# Patient Record
Sex: FEMALE | Race: WHITE | NOT HISPANIC OR LATINO | Employment: UNEMPLOYED | URBAN - METROPOLITAN AREA
[De-identification: names, ages, dates, MRNs, and addresses within clinical notes are randomized per-mention and may not be internally consistent; named-entity substitution may affect disease eponyms.]

---

## 2022-12-30 ENCOUNTER — LAB REQUISITION (OUTPATIENT)
Dept: LAB | Facility: CLINIC | Age: 62
End: 2022-12-30

## 2022-12-30 DIAGNOSIS — Z11.1 ENCOUNTER FOR SCREENING FOR RESPIRATORY TUBERCULOSIS: ICD-10-CM

## 2022-12-31 PROCEDURE — P9603 ONE-WAY ALLOW PRORATED MILES: HCPCS | Performed by: FAMILY MEDICINE

## 2022-12-31 PROCEDURE — 36415 COLL VENOUS BLD VENIPUNCTURE: CPT | Performed by: FAMILY MEDICINE

## 2022-12-31 PROCEDURE — 86481 TB AG RESPONSE T-CELL SUSP: CPT | Performed by: FAMILY MEDICINE

## 2023-01-01 LAB
GAMMA INTERFERON BACKGROUND BLD IA-ACNC: 0.05 IU/ML
M TB IFN-G BLD-IMP: NEGATIVE
M TB IFN-G CD4+ BCKGRND COR BLD-ACNC: 9.95 IU/ML
MITOGEN IGNF BCKGRD COR BLD-ACNC: 0 IU/ML
MITOGEN IGNF BCKGRD COR BLD-ACNC: 0.01 IU/ML
QUANTIFERON MITOGEN: 10 IU/ML
QUANTIFERON NIL TUBE: 0.05 IU/ML
QUANTIFERON TB1 TUBE: 0.05 IU/ML
QUANTIFERON TB2 TUBE: 0.06

## 2024-01-01 ENCOUNTER — LAB REQUISITION (OUTPATIENT)
Dept: LAB | Facility: CLINIC | Age: 64
End: 2024-01-01
Payer: COMMERCIAL

## 2024-01-01 ENCOUNTER — ANTICOAGULATION THERAPY VISIT (OUTPATIENT)
Dept: ANTICOAGULATION | Facility: CLINIC | Age: 64
End: 2024-01-01

## 2024-01-01 ENCOUNTER — TRANSITIONAL CARE UNIT VISIT (OUTPATIENT)
Dept: GERIATRICS | Facility: CLINIC | Age: 64
End: 2024-01-01
Payer: COMMERCIAL

## 2024-01-01 ENCOUNTER — TELEPHONE (OUTPATIENT)
Dept: ANTICOAGULATION | Facility: CLINIC | Age: 64
End: 2024-01-01

## 2024-01-01 ENCOUNTER — ANTICOAGULATION THERAPY VISIT (OUTPATIENT)
Dept: ANTICOAGULATION | Facility: CLINIC | Age: 64
End: 2024-01-01
Payer: COMMERCIAL

## 2024-01-01 ENCOUNTER — DOCUMENTATION ONLY (OUTPATIENT)
Dept: GERIATRICS | Facility: CLINIC | Age: 64
End: 2024-01-01
Payer: COMMERCIAL

## 2024-01-01 ENCOUNTER — TELEPHONE (OUTPATIENT)
Dept: GERIATRICS | Facility: CLINIC | Age: 64
End: 2024-01-01

## 2024-01-01 ENCOUNTER — LAB REQUISITION (OUTPATIENT)
Dept: LAB | Facility: CLINIC | Age: 64
End: 2024-01-01

## 2024-01-01 ENCOUNTER — OFFICE VISIT (OUTPATIENT)
Dept: GERIATRICS | Facility: CLINIC | Age: 64
End: 2024-01-01
Payer: COMMERCIAL

## 2024-01-01 VITALS
BODY MASS INDEX: 37.16 KG/M2 | OXYGEN SATURATION: 99 % | RESPIRATION RATE: 16 BRPM | SYSTOLIC BLOOD PRESSURE: 88 MMHG | WEIGHT: 245.2 LBS | HEIGHT: 68 IN | TEMPERATURE: 97.1 F | DIASTOLIC BLOOD PRESSURE: 58 MMHG | HEART RATE: 60 BPM

## 2024-01-01 VITALS
TEMPERATURE: 97.4 F | HEIGHT: 68 IN | SYSTOLIC BLOOD PRESSURE: 102 MMHG | RESPIRATION RATE: 18 BRPM | DIASTOLIC BLOOD PRESSURE: 59 MMHG | BODY MASS INDEX: 38.1 KG/M2 | OXYGEN SATURATION: 96 % | WEIGHT: 251.4 LBS | HEART RATE: 60 BPM

## 2024-01-01 VITALS
HEIGHT: 68 IN | BODY MASS INDEX: 36.98 KG/M2 | TEMPERATURE: 97.2 F | SYSTOLIC BLOOD PRESSURE: 104 MMHG | RESPIRATION RATE: 19 BRPM | HEART RATE: 80 BPM | DIASTOLIC BLOOD PRESSURE: 68 MMHG | OXYGEN SATURATION: 100 % | WEIGHT: 244 LBS

## 2024-01-01 VITALS
HEIGHT: 68 IN | RESPIRATION RATE: 16 BRPM | OXYGEN SATURATION: 93 % | SYSTOLIC BLOOD PRESSURE: 99 MMHG | BODY MASS INDEX: 37.71 KG/M2 | TEMPERATURE: 98.3 F | HEART RATE: 60 BPM | WEIGHT: 248.8 LBS | DIASTOLIC BLOOD PRESSURE: 66 MMHG

## 2024-01-01 VITALS
RESPIRATION RATE: 18 BRPM | SYSTOLIC BLOOD PRESSURE: 111 MMHG | DIASTOLIC BLOOD PRESSURE: 65 MMHG | HEIGHT: 68 IN | OXYGEN SATURATION: 94 % | HEART RATE: 60 BPM | WEIGHT: 252.6 LBS | BODY MASS INDEX: 38.28 KG/M2 | TEMPERATURE: 97 F

## 2024-01-01 VITALS
OXYGEN SATURATION: 91 % | DIASTOLIC BLOOD PRESSURE: 75 MMHG | WEIGHT: 255 LBS | HEART RATE: 60 BPM | HEIGHT: 68 IN | BODY MASS INDEX: 38.65 KG/M2 | RESPIRATION RATE: 16 BRPM | SYSTOLIC BLOOD PRESSURE: 125 MMHG | TEMPERATURE: 97.5 F

## 2024-01-01 VITALS
WEIGHT: 239.8 LBS | SYSTOLIC BLOOD PRESSURE: 100 MMHG | BODY MASS INDEX: 37 KG/M2 | HEART RATE: 82 BPM | OXYGEN SATURATION: 97 % | DIASTOLIC BLOOD PRESSURE: 67 MMHG | RESPIRATION RATE: 16 BRPM | TEMPERATURE: 98.2 F

## 2024-01-01 VITALS
WEIGHT: 240.2 LBS | HEART RATE: 100 BPM | DIASTOLIC BLOOD PRESSURE: 68 MMHG | RESPIRATION RATE: 16 BRPM | BODY MASS INDEX: 37.07 KG/M2 | SYSTOLIC BLOOD PRESSURE: 102 MMHG | OXYGEN SATURATION: 90 % | TEMPERATURE: 97.6 F

## 2024-01-01 VITALS
RESPIRATION RATE: 16 BRPM | OXYGEN SATURATION: 96 % | WEIGHT: 248.8 LBS | SYSTOLIC BLOOD PRESSURE: 96 MMHG | HEART RATE: 68 BPM | BODY MASS INDEX: 38.39 KG/M2 | TEMPERATURE: 98.3 F | DIASTOLIC BLOOD PRESSURE: 68 MMHG

## 2024-01-01 DIAGNOSIS — R07.9 ACUTE CHEST PAIN: Primary | ICD-10-CM

## 2024-01-01 DIAGNOSIS — Z95.810 S/P ICD (INTERNAL CARDIAC DEFIBRILLATOR) PROCEDURE: ICD-10-CM

## 2024-01-01 DIAGNOSIS — M54.40 LOW BACK PAIN WITH SCIATICA, SCIATICA LATERALITY UNSPECIFIED, UNSPECIFIED BACK PAIN LATERALITY, UNSPECIFIED CHRONICITY: ICD-10-CM

## 2024-01-01 DIAGNOSIS — M54.31 SCIATICA, RIGHT SIDE: ICD-10-CM

## 2024-01-01 DIAGNOSIS — I48.91 ATRIAL FIBRILLATION WITH RVR (H): Primary | ICD-10-CM

## 2024-01-01 DIAGNOSIS — I50.33 ACUTE ON CHRONIC DIASTOLIC HEART FAILURE (H): ICD-10-CM

## 2024-01-01 DIAGNOSIS — I10 ESSENTIAL (PRIMARY) HYPERTENSION: ICD-10-CM

## 2024-01-01 DIAGNOSIS — I48.20 CHRONIC ATRIAL FIBRILLATION (H): Primary | ICD-10-CM

## 2024-01-01 DIAGNOSIS — I50.23 ACUTE ON CHRONIC SYSTOLIC (CONGESTIVE) HEART FAILURE (H): ICD-10-CM

## 2024-01-01 DIAGNOSIS — E86.1 HYPOTENSION DUE TO HYPOVOLEMIA: ICD-10-CM

## 2024-01-01 DIAGNOSIS — E08.00 DIABETES MELLITUS DUE TO UNDERLYING CONDITION WITH HYPEROSMOLARITY WITHOUT COMA, WITHOUT LONG-TERM CURRENT USE OF INSULIN (H): ICD-10-CM

## 2024-01-01 DIAGNOSIS — I48.91 UNSPECIFIED ATRIAL FIBRILLATION (H): ICD-10-CM

## 2024-01-01 DIAGNOSIS — Z95.810 PRESENCE OF AUTOMATIC (IMPLANTABLE) CARDIAC DEFIBRILLATOR: ICD-10-CM

## 2024-01-01 DIAGNOSIS — R52 PAIN: ICD-10-CM

## 2024-01-01 DIAGNOSIS — I10 HYPERTENSION, UNSPECIFIED TYPE: ICD-10-CM

## 2024-01-01 DIAGNOSIS — E86.0 DEHYDRATION: ICD-10-CM

## 2024-01-01 DIAGNOSIS — R44.3 HALLUCINATIONS: ICD-10-CM

## 2024-01-01 DIAGNOSIS — G89.4 CHRONIC PAIN DISORDER: ICD-10-CM

## 2024-01-01 DIAGNOSIS — R07.9 ACUTE CHEST PAIN: ICD-10-CM

## 2024-01-01 DIAGNOSIS — I10 HYPERTENSION, UNSPECIFIED TYPE: Primary | ICD-10-CM

## 2024-01-01 DIAGNOSIS — I25.10 CORONARY ARTERY DISEASE INVOLVING NATIVE CORONARY ARTERY OF NATIVE HEART WITHOUT ANGINA PECTORIS: ICD-10-CM

## 2024-01-01 DIAGNOSIS — E03.9 HYPOTHYROIDISM, UNSPECIFIED: ICD-10-CM

## 2024-01-01 DIAGNOSIS — R52 PAIN: Primary | ICD-10-CM

## 2024-01-01 DIAGNOSIS — E03.2 HYPOTHYROIDISM DUE TO MEDICATION: ICD-10-CM

## 2024-01-01 DIAGNOSIS — R41.0 DISORIENTATION, UNSPECIFIED: ICD-10-CM

## 2024-01-01 DIAGNOSIS — G93.40 ACUTE ENCEPHALOPATHY: ICD-10-CM

## 2024-01-01 DIAGNOSIS — I48.91 ATRIAL FIBRILLATION WITH RVR (H): ICD-10-CM

## 2024-01-01 DIAGNOSIS — I87.2 VENOUS INSUFFICIENCY (CHRONIC) (PERIPHERAL): ICD-10-CM

## 2024-01-01 DIAGNOSIS — G89.4 CHRONIC PAIN DISORDER: Primary | ICD-10-CM

## 2024-01-01 DIAGNOSIS — I48.20 CHRONIC ATRIAL FIBRILLATION (H): ICD-10-CM

## 2024-01-01 DIAGNOSIS — R44.3 HALLUCINATIONS, UNSPECIFIED: ICD-10-CM

## 2024-01-01 DIAGNOSIS — M25.551 HIP PAIN, RIGHT: ICD-10-CM

## 2024-01-01 DIAGNOSIS — N30.00 ACUTE CYSTITIS WITHOUT HEMATURIA: ICD-10-CM
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
## 2024-01-01 DIAGNOSIS — Z98.890 HX OF ATRIOVENTRICULAR NODE ABLATION: ICD-10-CM

## 2024-01-01 DIAGNOSIS — Z98.890 HX OF ATRIOVENTRICULAR NODE ABLATION: Primary | ICD-10-CM

## 2024-01-01 LAB
ALBUMIN UR-MCNC: 10 MG/DL
ALBUMIN UR-MCNC: NEGATIVE MG/DL
ANION GAP SERPL CALCULATED.3IONS-SCNC: 10 MMOL/L (ref 7–15)
ANION GAP SERPL CALCULATED.3IONS-SCNC: 10 MMOL/L (ref 7–15)
ANION GAP SERPL CALCULATED.3IONS-SCNC: 11 MMOL/L (ref 7–15)
ANION GAP SERPL CALCULATED.3IONS-SCNC: 12 MMOL/L (ref 7–15)
ANION GAP SERPL CALCULATED.3IONS-SCNC: 13 MMOL/L (ref 7–15)
ANION GAP SERPL CALCULATED.3IONS-SCNC: 14 MMOL/L (ref 7–15)
APPEARANCE UR: ABNORMAL
APPEARANCE UR: ABNORMAL
BACTERIA UR CULT: ABNORMAL
BACTERIA UR CULT: NO GROWTH
BILIRUB UR QL STRIP: NEGATIVE
BILIRUB UR QL STRIP: NEGATIVE
BUN SERPL-MCNC: 15.7 MG/DL (ref 8–23)
BUN SERPL-MCNC: 24 MG/DL (ref 8–23)
BUN SERPL-MCNC: 25.8 MG/DL (ref 8–23)
BUN SERPL-MCNC: 26.2 MG/DL (ref 8–23)
BUN SERPL-MCNC: 27 MG/DL (ref 8–23)
BUN SERPL-MCNC: 28.3 MG/DL (ref 8–23)
BUN SERPL-MCNC: 30.8 MG/DL (ref 8–23)
BUN SERPL-MCNC: 31 MG/DL (ref 8–23)
CALCIUM SERPL-MCNC: 9 MG/DL (ref 8.8–10.2)
CALCIUM SERPL-MCNC: 9.2 MG/DL (ref 8.8–10.2)
CALCIUM SERPL-MCNC: 9.2 MG/DL (ref 8.8–10.2)
CALCIUM SERPL-MCNC: 9.3 MG/DL (ref 8.8–10.2)
CALCIUM SERPL-MCNC: 9.5 MG/DL (ref 8.8–10.2)
CALCIUM SERPL-MCNC: 9.6 MG/DL (ref 8.8–10.2)
CHLORIDE SERPL-SCNC: 100 MMOL/L (ref 98–107)
CHLORIDE SERPL-SCNC: 97 MMOL/L (ref 98–107)
CHLORIDE SERPL-SCNC: 97 MMOL/L (ref 98–107)
CHLORIDE SERPL-SCNC: 98 MMOL/L (ref 98–107)
CHLORIDE SERPL-SCNC: 99 MMOL/L (ref 98–107)
CHLORIDE SERPL-SCNC: 99 MMOL/L (ref 98–107)
COLOR UR AUTO: YELLOW
COLOR UR AUTO: YELLOW
CREAT SERPL-MCNC: 1.14 MG/DL (ref 0.51–0.95)
CREAT SERPL-MCNC: 1.23 MG/DL (ref 0.51–0.95)
CREAT SERPL-MCNC: 1.33 MG/DL (ref 0.51–0.95)
CREAT SERPL-MCNC: 1.37 MG/DL (ref 0.51–0.95)
CREAT SERPL-MCNC: 1.44 MG/DL (ref 0.51–0.95)
CREAT SERPL-MCNC: 1.47 MG/DL (ref 0.51–0.95)
CREAT SERPL-MCNC: 1.53 MG/DL (ref 0.51–0.95)
CREAT SERPL-MCNC: 1.68 MG/DL (ref 0.51–0.95)
DEPRECATED HCO3 PLAS-SCNC: 22 MMOL/L (ref 22–29)
DEPRECATED HCO3 PLAS-SCNC: 22 MMOL/L (ref 22–29)
DEPRECATED HCO3 PLAS-SCNC: 24 MMOL/L (ref 22–29)
DEPRECATED HCO3 PLAS-SCNC: 25 MMOL/L (ref 22–29)
DEPRECATED HCO3 PLAS-SCNC: 26 MMOL/L (ref 22–29)
DEPRECATED HCO3 PLAS-SCNC: 28 MMOL/L (ref 22–29)
EGFRCR SERPLBLD CKD-EPI 2021: 34 ML/MIN/1.73M2
EGFRCR SERPLBLD CKD-EPI 2021: 38 ML/MIN/1.73M2
EGFRCR SERPLBLD CKD-EPI 2021: 40 ML/MIN/1.73M2
EGFRCR SERPLBLD CKD-EPI 2021: 41 ML/MIN/1.73M2
EGFRCR SERPLBLD CKD-EPI 2021: 43 ML/MIN/1.73M2
EGFRCR SERPLBLD CKD-EPI 2021: 45 ML/MIN/1.73M2
EGFRCR SERPLBLD CKD-EPI 2021: 49 ML/MIN/1.73M2
EGFRCR SERPLBLD CKD-EPI 2021: 54 ML/MIN/1.73M2
ERYTHROCYTE [DISTWIDTH] IN BLOOD BY AUTOMATED COUNT: 12.5 % (ref 10–15)
ERYTHROCYTE [DISTWIDTH] IN BLOOD BY AUTOMATED COUNT: 12.6 % (ref 10–15)
ERYTHROCYTE [DISTWIDTH] IN BLOOD BY AUTOMATED COUNT: 12.7 % (ref 10–15)
ERYTHROCYTE [DISTWIDTH] IN BLOOD BY AUTOMATED COUNT: 12.9 % (ref 10–15)
GLUCOSE SERPL-MCNC: 109 MG/DL (ref 70–99)
GLUCOSE SERPL-MCNC: 165 MG/DL (ref 70–99)
GLUCOSE SERPL-MCNC: 79 MG/DL (ref 70–99)
GLUCOSE SERPL-MCNC: 83 MG/DL (ref 70–99)
GLUCOSE SERPL-MCNC: 84 MG/DL (ref 70–99)
GLUCOSE SERPL-MCNC: 86 MG/DL (ref 70–99)
GLUCOSE SERPL-MCNC: 92 MG/DL (ref 70–99)
GLUCOSE SERPL-MCNC: 93 MG/DL (ref 70–99)
GLUCOSE UR STRIP-MCNC: NEGATIVE MG/DL
GLUCOSE UR STRIP-MCNC: NEGATIVE MG/DL
HCT VFR BLD AUTO: 31.6 % (ref 35–47)
HCT VFR BLD AUTO: 35.9 % (ref 35–47)
HCT VFR BLD AUTO: 36.1 % (ref 35–47)
HCT VFR BLD AUTO: 39.4 % (ref 35–47)
HGB BLD-MCNC: 11.2 G/DL (ref 11.7–15.7)
HGB BLD-MCNC: 11.2 G/DL (ref 11.7–15.7)
HGB BLD-MCNC: 12.2 G/DL (ref 11.7–15.7)
HGB BLD-MCNC: 9.7 G/DL (ref 11.7–15.7)
HGB UR QL STRIP: ABNORMAL
HGB UR QL STRIP: NEGATIVE
INR (EXTERNAL): 1.8 (ref 0.9–1.1)
INR (EXTERNAL): 2.1
INR (EXTERNAL): 2.3 (ref 0.9–1.1)
INR (EXTERNAL): 2.7
INR (EXTERNAL): 2.7 (ref 0.9–1.1)
INR (EXTERNAL): 2.7 (ref 0.9–1.1)
INR (EXTERNAL): 2.8
INR (EXTERNAL): 2.8 (ref 0.9–1.1)
INR (EXTERNAL): 2.9 (ref 0.9–1.1)
INR (EXTERNAL): 3.1 (ref 0.9–1.1)
INR (EXTERNAL): 3.3 (ref 0.9–1.1)
INR (EXTERNAL): 3.3 (ref 0.9–1.1)
INR (EXTERNAL): 3.6 (ref 0.9–1.1)
INR (EXTERNAL): 4 (ref 0.9–1.1)
KETONES UR STRIP-MCNC: NEGATIVE MG/DL
KETONES UR STRIP-MCNC: NEGATIVE MG/DL
LEUKOCYTE ESTERASE UR QL STRIP: ABNORMAL
LEUKOCYTE ESTERASE UR QL STRIP: NEGATIVE
MCH RBC QN AUTO: 28 PG (ref 26.5–33)
MCH RBC QN AUTO: 28.6 PG (ref 26.5–33)
MCH RBC QN AUTO: 28.6 PG (ref 26.5–33)
MCH RBC QN AUTO: 28.8 PG (ref 26.5–33)
MCHC RBC AUTO-ENTMCNC: 30.7 G/DL (ref 31.5–36.5)
MCHC RBC AUTO-ENTMCNC: 31 G/DL (ref 31.5–36.5)
MCHC RBC AUTO-ENTMCNC: 31 G/DL (ref 31.5–36.5)
MCHC RBC AUTO-ENTMCNC: 31.2 G/DL (ref 31.5–36.5)
MCV RBC AUTO: 91 FL (ref 78–100)
MCV RBC AUTO: 92 FL (ref 78–100)
MCV RBC AUTO: 92 FL (ref 78–100)
MCV RBC AUTO: 93 FL (ref 78–100)
MUCOUS THREADS #/AREA URNS LPF: PRESENT /LPF
MUCOUS THREADS #/AREA URNS LPF: PRESENT /LPF
NITRATE UR QL: NEGATIVE
NITRATE UR QL: NEGATIVE
PH UR STRIP: 5.5 [PH] (ref 5–7)
PH UR STRIP: 5.5 [PH] (ref 5–7)
PLATELET # BLD AUTO: 225 10E3/UL (ref 150–450)
PLATELET # BLD AUTO: 227 10E3/UL (ref 150–450)
PLATELET # BLD AUTO: 279 10E3/UL (ref 150–450)
PLATELET # BLD AUTO: 315 10E3/UL (ref 150–450)
POTASSIUM SERPL-SCNC: 4.4 MMOL/L (ref 3.4–5.3)
POTASSIUM SERPL-SCNC: 4.7 MMOL/L (ref 3.4–5.3)
POTASSIUM SERPL-SCNC: 4.8 MMOL/L (ref 3.4–5.3)
POTASSIUM SERPL-SCNC: 5 MMOL/L (ref 3.4–5.3)
POTASSIUM SERPL-SCNC: 5.2 MMOL/L (ref 3.4–5.3)
POTASSIUM SERPL-SCNC: 5.4 MMOL/L (ref 3.4–5.3)
RBC # BLD AUTO: 3.47 10E6/UL (ref 3.8–5.2)
RBC # BLD AUTO: 3.89 10E6/UL (ref 3.8–5.2)
RBC # BLD AUTO: 3.92 10E6/UL (ref 3.8–5.2)
RBC # BLD AUTO: 4.27 10E6/UL (ref 3.8–5.2)
RBC URINE: 3 /HPF
RBC URINE: 3 /HPF
SODIUM SERPL-SCNC: 133 MMOL/L (ref 135–145)
SODIUM SERPL-SCNC: 134 MMOL/L (ref 135–145)
SODIUM SERPL-SCNC: 134 MMOL/L (ref 135–145)
SODIUM SERPL-SCNC: 135 MMOL/L (ref 135–145)
SODIUM SERPL-SCNC: 135 MMOL/L (ref 135–145)
SODIUM SERPL-SCNC: 136 MMOL/L (ref 135–145)
SODIUM SERPL-SCNC: 136 MMOL/L (ref 135–145)
SODIUM SERPL-SCNC: 138 MMOL/L (ref 135–145)
SP GR UR STRIP: 1.01 (ref 1–1.03)
SP GR UR STRIP: 1.02 (ref 1–1.03)
SQUAMOUS EPITHELIAL: 1 /HPF
SQUAMOUS EPITHELIAL: 3 /HPF
T3 SERPL-MCNC: 61 NG/DL (ref 85–202)
T4 FREE SERPL-MCNC: 0.81 NG/DL (ref 0.9–1.7)
TRANSITIONAL EPI: <1 /HPF
TSH SERPL DL<=0.005 MIU/L-ACNC: 11.6 UIU/ML (ref 0.3–4.2)
TSH SERPL DL<=0.005 MIU/L-ACNC: 15.6 UIU/ML (ref 0.3–4.2)
URATE CRY #/AREA URNS HPF: ABNORMAL /HPF
UROBILINOGEN UR STRIP-MCNC: NORMAL MG/DL
UROBILINOGEN UR STRIP-MCNC: NORMAL MG/DL
WBC # BLD AUTO: 10 10E3/UL (ref 4–11)
WBC # BLD AUTO: 10.9 10E3/UL (ref 4–11)
WBC # BLD AUTO: 8.5 10E3/UL (ref 4–11)
WBC # BLD AUTO: 9 10E3/UL (ref 4–11)
WBC CLUMPS #/AREA URNS HPF: PRESENT /HPF
WBC URINE: 1 /HPF
WBC URINE: >182 /HPF

## 2024-01-01 PROCEDURE — 80048 BASIC METABOLIC PNL TOTAL CA: CPT | Mod: ORL | Performed by: FAMILY MEDICINE

## 2024-01-01 PROCEDURE — 85027 COMPLETE CBC AUTOMATED: CPT | Mod: ORL | Performed by: NURSE PRACTITIONER

## 2024-01-01 PROCEDURE — 84443 ASSAY THYROID STIM HORMONE: CPT | Mod: ORL | Performed by: NURSE PRACTITIONER

## 2024-01-01 PROCEDURE — 84439 ASSAY OF FREE THYROXINE: CPT | Mod: ORL | Performed by: NURSE PRACTITIONER

## 2024-01-01 PROCEDURE — 80048 BASIC METABOLIC PNL TOTAL CA: CPT | Mod: ORL | Performed by: NURSE PRACTITIONER

## 2024-01-01 PROCEDURE — 81001 URINALYSIS AUTO W/SCOPE: CPT | Mod: ORL | Performed by: NURSE PRACTITIONER

## 2024-01-01 PROCEDURE — 87086 URINE CULTURE/COLONY COUNT: CPT | Mod: ORL | Performed by: NURSE PRACTITIONER

## 2024-01-01 PROCEDURE — 36415 COLL VENOUS BLD VENIPUNCTURE: CPT | Mod: ORL | Performed by: FAMILY MEDICINE

## 2024-01-01 PROCEDURE — 36415 COLL VENOUS BLD VENIPUNCTURE: CPT | Mod: ORL | Performed by: NURSE PRACTITIONER

## 2024-01-01 PROCEDURE — 99309 SBSQ NF CARE MODERATE MDM 30: CPT | Performed by: NURSE PRACTITIONER

## 2024-01-01 PROCEDURE — P9604 ONE-WAY ALLOW PRORATED TRIP: HCPCS | Mod: ORL | Performed by: NURSE PRACTITIONER

## 2024-01-01 PROCEDURE — P9604 ONE-WAY ALLOW PRORATED TRIP: HCPCS | Mod: ORL | Performed by: FAMILY MEDICINE

## 2024-01-01 PROCEDURE — 87186 SC STD MICRODIL/AGAR DIL: CPT | Mod: ORL | Performed by: NURSE PRACTITIONER

## 2024-01-01 PROCEDURE — 84480 ASSAY TRIIODOTHYRONINE (T3): CPT | Mod: ORL | Performed by: NURSE PRACTITIONER

## 2024-01-01 PROCEDURE — 99310 SBSQ NF CARE HIGH MDM 45: CPT | Performed by: NURSE PRACTITIONER

## 2024-01-01 RX ORDER — TRAMADOL HYDROCHLORIDE 25 MG/1
25 TABLET, COATED ORAL EVERY 6 HOURS PRN
Qty: 30 TABLET | Refills: 0 | Status: SHIPPED | OUTPATIENT
Start: 2024-01-01 | End: 2024-01-01

## 2024-01-01 RX ORDER — SULFAMETHOXAZOLE/TRIMETHOPRIM 800-160 MG
1 TABLET ORAL 2 TIMES DAILY
COMMUNITY
Start: 2024-01-01 | End: 2024-01-01

## 2024-01-01 RX ORDER — AMIODARONE HYDROCHLORIDE 200 MG/1
200 TABLET ORAL DAILY
COMMUNITY
Start: 2024-01-01 | End: 2024-01-01

## 2024-01-01 RX ORDER — TRAMADOL HYDROCHLORIDE 50 MG/1
TABLET ORAL
Qty: 16 TABLET | Refills: 1 | Status: SHIPPED | OUTPATIENT
Start: 2024-01-01 | End: 2024-01-01

## 2024-01-01 RX ORDER — TRAMADOL HYDROCHLORIDE 25 MG/1
25 TABLET, COATED ORAL EVERY 6 HOURS PRN
COMMUNITY
Start: 2024-01-01 | End: 2024-01-01

## 2024-01-01 RX ORDER — ALBUTEROL SULFATE 90 UG/1
2 AEROSOL, METERED RESPIRATORY (INHALATION) 4 TIMES DAILY PRN
COMMUNITY
Start: 2024-01-01

## 2024-01-01 RX ORDER — LEVOTHYROXINE SODIUM 25 UG/1
25 TABLET ORAL
COMMUNITY

## 2024-01-01 RX ORDER — CLONAZEPAM 0.5 MG/1
0.5 TABLET ORAL DAILY PRN
COMMUNITY
Start: 2024-01-01

## 2024-01-01 RX ORDER — AMIODARONE HYDROCHLORIDE 200 MG/1
400 TABLET ORAL 2 TIMES DAILY
COMMUNITY
Start: 2024-01-01 | End: 2024-01-01

## 2024-01-01 RX ORDER — FUROSEMIDE 40 MG
20 TABLET ORAL DAILY PRN
COMMUNITY
Start: 2024-01-01

## 2024-01-01 RX ORDER — GABAPENTIN 600 MG/1
300-900 TABLET ORAL 3 TIMES DAILY
COMMUNITY
Start: 2024-01-01

## 2024-01-01 RX ORDER — TRAMADOL HYDROCHLORIDE 25 MG/1
25 TABLET, COATED ORAL EVERY 8 HOURS PRN
Qty: 10 TABLET | Refills: 0 | Status: SHIPPED | OUTPATIENT
Start: 2024-01-01 | End: 2024-01-01

## 2024-01-01 RX ORDER — FERROUS SULFATE 325(65) MG
325 TABLET ORAL
COMMUNITY
Start: 2024-01-01

## 2024-01-01 RX ORDER — FEXOFENADINE HCL 60 MG/1
60 TABLET, FILM COATED ORAL 2 TIMES DAILY PRN
COMMUNITY
Start: 2024-01-01

## 2024-01-01 RX ORDER — TRAMADOL HYDROCHLORIDE 50 MG/1
TABLET ORAL
Qty: 30 TABLET | Refills: 0 | Status: SHIPPED | OUTPATIENT
Start: 2024-01-01

## 2024-01-01 RX ORDER — DIPHENHYDRAMINE HCL 25 MG
25 CAPSULE ORAL
COMMUNITY
Start: 2024-01-01 | End: 2024-01-01

## 2024-01-01 RX ORDER — LANOLIN ALCOHOL/MO/W.PET/CERES
3 CREAM (GRAM) TOPICAL
COMMUNITY
Start: 2024-01-01

## 2024-01-01 RX ORDER — SENNOSIDES 8.6 MG
650 CAPSULE ORAL EVERY 8 HOURS PRN
COMMUNITY
Start: 2024-01-01

## 2024-01-01 RX ORDER — CYCLOBENZAPRINE HCL 5 MG
5 TABLET ORAL 3 TIMES DAILY
COMMUNITY
Start: 2024-01-01 | End: 2024-01-01

## 2024-01-01 RX ORDER — MAGNESIUM OXIDE 400 MG/1
400 TABLET ORAL DAILY
COMMUNITY
Start: 2024-01-01

## 2024-01-01 RX ORDER — LISINOPRIL 5 MG/1
5 TABLET ORAL DAILY
COMMUNITY
Start: 2024-01-01 | End: 2024-01-01

## 2024-01-01 RX ORDER — ATORVASTATIN CALCIUM 80 MG/1
80 TABLET, FILM COATED ORAL DAILY
COMMUNITY
Start: 2024-01-01

## 2024-01-01 RX ORDER — DULOXETIN HYDROCHLORIDE 30 MG/1
30 CAPSULE, DELAYED RELEASE ORAL DAILY
COMMUNITY

## 2024-01-01 RX ORDER — SPIRONOLACTONE 25 MG/1
25 TABLET ORAL DAILY
COMMUNITY
Start: 2024-01-01 | End: 2024-01-01

## 2024-01-01 RX ORDER — BUPROPION HYDROCHLORIDE 75 MG/1
120 TABLET ORAL 2 TIMES DAILY
COMMUNITY
Start: 2024-01-01

## 2024-01-01 RX ORDER — TRAZODONE HYDROCHLORIDE 50 MG/1
50 TABLET, FILM COATED ORAL
COMMUNITY
Start: 2024-01-01

## 2024-01-01 RX ORDER — AMIODARONE HYDROCHLORIDE 200 MG/1
400 TABLET ORAL DAILY
COMMUNITY
Start: 2024-01-01 | End: 2024-01-01

## 2024-01-01 RX ORDER — NITROGLYCERIN 0.4 MG/1
0.4 TABLET SUBLINGUAL EVERY 5 MIN PRN
COMMUNITY
Start: 2024-01-01

## 2024-01-01 RX ORDER — BISOPROLOL FUMARATE 5 MG/1
5 TABLET, FILM COATED ORAL DAILY
COMMUNITY
Start: 2024-01-01

## 2024-01-01 RX ORDER — ASPIRIN 81 MG/1
162 TABLET ORAL DAILY
COMMUNITY
Start: 2024-01-01

## 2024-05-07 PROBLEM — G47.33 OBSTRUCTIVE SLEEP APNEA: Status: ACTIVE | Noted: 2024-01-01

## 2024-05-07 PROBLEM — M17.0 PRIMARY OSTEOARTHRITIS OF BOTH KNEES: Status: ACTIVE | Noted: 2018-06-18

## 2024-05-07 PROBLEM — M16.0 OSTEOARTHRITIS OF BOTH HIPS: Status: ACTIVE | Noted: 2023-01-01

## 2024-05-07 PROBLEM — Z95.810 ICD (IMPLANTABLE CARDIOVERTER-DEFIBRILLATOR) IN PLACE: Status: ACTIVE | Noted: 2022-12-03

## 2024-05-07 PROBLEM — Z74.09 DECREASED FUNCTIONAL MOBILITY AND ENDURANCE: Status: ACTIVE | Noted: 2024-01-01

## 2024-05-07 PROBLEM — M54.31 SCIATICA, RIGHT SIDE: Status: ACTIVE | Noted: 2023-05-11

## 2024-05-07 PROBLEM — I87.2 VENOUS STASIS DERMATITIS OF BOTH LOWER EXTREMITIES: Status: ACTIVE | Noted: 2023-05-11

## 2024-05-07 PROBLEM — I10 HYPERTENSION: Status: ACTIVE | Noted: 2024-01-01

## 2024-05-07 PROBLEM — I48.91 ATRIAL FIBRILLATION WITH RVR (H): Status: ACTIVE | Noted: 2024-01-01

## 2024-05-07 PROBLEM — D72.829 LEUKOCYTOSIS: Status: ACTIVE | Noted: 2022-12-03

## 2024-05-07 PROBLEM — U07.1 COVID-19: Status: ACTIVE | Noted: 2021-12-01

## 2024-05-07 PROBLEM — R05.9 COUGH: Status: ACTIVE | Noted: 2018-05-17

## 2024-05-07 PROBLEM — I47.20 VENTRICULAR TACHYCARDIA (H): Status: ACTIVE | Noted: 2022-11-01

## 2024-05-07 PROBLEM — R60.0 BILATERAL LOWER EXTREMITY EDEMA: Status: ACTIVE | Noted: 2023-05-11

## 2024-05-07 PROBLEM — R55 PRE-SYNCOPE: Status: ACTIVE | Noted: 2022-12-03

## 2024-05-07 PROBLEM — M51.369 DEGENERATIVE DISC DISEASE, LUMBAR: Status: ACTIVE | Noted: 2024-01-01

## 2024-05-07 PROBLEM — G89.4 CHRONIC PAIN DISORDER: Status: ACTIVE | Noted: 2018-05-17

## 2024-05-07 PROBLEM — F33.2 MAJOR DEPRESSIVE DISORDER, RECURRENT SEVERE WITHOUT PSYCHOTIC FEATURES (H): Status: ACTIVE | Noted: 2023-01-10

## 2024-05-07 PROBLEM — J30.9 ALLERGIC RHINITIS, MILD: Status: ACTIVE | Noted: 2024-01-01

## 2024-05-07 PROBLEM — B02.23 SHINGLES (HERPES ZOSTER) POLYNEUROPATHY: Status: ACTIVE | Noted: 2022-12-04

## 2024-05-07 PROBLEM — I25.10 CORONARY ATHEROSCLEROSIS: Status: ACTIVE | Noted: 2024-01-01

## 2024-05-07 PROBLEM — I50.23 ACUTE ON CHRONIC HFREF (HEART FAILURE WITH REDUCED EJECTION FRACTION) (H): Status: ACTIVE | Noted: 2024-01-01

## 2024-05-07 PROBLEM — E87.20 LACTIC ACIDOSIS: Status: ACTIVE | Noted: 2024-01-01

## 2024-05-07 PROBLEM — M17.0 OSTEOARTHRITIS OF BOTH KNEES: Status: ACTIVE | Noted: 2018-06-18

## 2024-05-07 PROBLEM — G47.20 CIRCADIAN RHYTHM SLEEP DISORDER: Status: ACTIVE | Noted: 2017-07-06

## 2024-05-07 PROBLEM — I48.0 PAROXYSMAL ATRIAL FIBRILLATION (H): Status: ACTIVE | Noted: 2023-01-01

## 2024-05-07 PROBLEM — R87.810 CERVICAL HIGH RISK HPV (HUMAN PAPILLOMAVIRUS) TEST POSITIVE: Status: ACTIVE | Noted: 2023-05-11

## 2024-05-07 PROBLEM — R79.89 ELEVATED TROPONIN: Status: ACTIVE | Noted: 2022-12-03

## 2024-05-07 PROBLEM — Z86.19 HISTORY OF SEPSIS: Status: ACTIVE | Noted: 2024-01-01

## 2024-05-07 PROBLEM — T14.8XXA OPEN WOUND OF SKIN: Status: ACTIVE | Noted: 2023-04-09

## 2024-05-07 NOTE — PROGRESS NOTES
ANTICOAGULATION MANAGEMENT     Vikki Evans 63 year old female is on warfarin with therapeutic INR result. (Goal INR 2.0-3.0)    Recent labs: (last 7 days)     05/07/24  0000   INR 2.8*       ASSESSMENT     Source(s): Chart Review and Home Care/Facility Nurse     Warfarin doses taken: Reviewed in chart  Diet: No new diet changes identified  Medication/supplement changes: Pt on amiodarone 400 mg BID X 3 days then 400 mg every day X 14 days then 200 mg daily  New illness, injury, or hospitalization: Yes: hospitalized at [facility] from 4/28/24 to 5/6/24 then transferred to TCU for ICD inappropriate shock secondary to A-fib RVR with acute chronic heart failure and cardiogenic shock.  Signs or symptoms of bleeding or clotting: No  Previous result:  new on warfarin day # 8  Additional findings: Pt started on 2 mg daily on 5/6/24.       PLAN     Recommended plan for ongoing change(s) affecting INR     Dosing Instructions: Continue your current warfarin dose with next INR in 2 days       Summary  As of 5/7/2024      Full warfarin instructions:  2 mg every day   Next INR check:  5/9/2024               Telephone call with Carmela at [phone] facility nurse who agrees to plan and repeated back plan correctly    Orders given to  Homecare nurse/facility to recheck    Education provided:   Contact [phone]  with any changes, questions or concerns.     Plan made with [facility] Pharmacist Nieves Nava, RN  Anticoagulation Clinic  5/7/2024    _______________________________________________________________________     Anticoagulation Episode Summary       Current INR goal:  2.0-3.0   TTR:  --   Target end date:  Indefinite   Send INR reminders to:  TAMMI MARQUES    Indications    Atrial fibrillation with RVR (H) [I48.91]             Comments:  A-Fib             Anticoagulation Care Providers       Provider Role Specialty Phone number    Nina Birch NP Referring Nurse Practitioner [phone]

## 2024-05-07 NOTE — PROGRESS NOTES
[facility]    Code Status:  FULL CODE   Visit Type:   Chief Complaint   Patient presents with    TCU Admission     [facility] 4/28/2024 - 5/6/2024     Facility:  [facility] ([facility]) [[id number]]         HPI: Vikki Evans is a 63 year old female who I am seeing today for admit to the TCU. Past medical history includes   VT s/p ablation (2016), NSVT, paroxysmal atrial fibrillation/flutter, chronic HFrEF, CAD status post stents, MVR, TVR, hypertension, hyperlipidemia, NARESH, DM2, and depression. Pt presented with fatigue and nausea. She had recently been at the ER and had leukocytosis with questionable pulmonary infiltrate. She was sent home on Augmentin and Z pack for possible pneumonia. Progressive dyspnea with inappropriate ICD shock 2/t A fib with RVR. Pt found to have acute on chronic CHF with cardiogenic shock. Pt initially required O2 up to 10L. Device interrogated with mx inappropriate shocks of A fib. She was treated with IV amiodarone and Digoxin and transitioned to oral amiodarone.  DEANA done which did not demonstrate LV aneurysm. AC changed to Coumadin. She was treated with IV diuretics and transitioned to orals.     Cardiac CT showed:     No intracardiac mass or thrombus is detected.  No left atrial appendage thrombus.  Possible pseudoaneurysm versus contained rupture (1 x 0.8 cm) noted at the anterior/apical left ventricular wall.  No pericardial effusion.  Prominent apical calcification.  Biventricular ICD is present.  Left ventricular enlargement is present.     Transitional Care Course: Today pt sitting up in bedside chair. She denies any SOB or CP. She is off oxygen. She using CPAP at HS. She continues on Lasix. Trace BLE edema. She reports chronic pain in her back and legs. She continues on home gabapentin. She has prn tramadol she can also use. Pt reports some dizziness with changing positions. She is on mx meds that could be contributory. BP and pulse appears satisfactory. She  reports she is voiding adequately. No further shocks reported.       Assessment/Plan:     Atrial fibrillation with RVR (H)  S/P ICD (internal cardiac defibrillator) procedure  -Amiodarone tapering.   -Denies CP or palpitations.   -INR today 2.8.   -Coumadin Clinic to dose INR.     Acute on chronic diastolic heart failure (H)  -Lasix 40 mg every day.   -Lisinopril 5 mg every day.   -Spironolactone 12.5 mg every day.   -every day weights.   -Follow up BMP.     Coronary artery disease involving native coronary artery of native heart without angina pectoris  -Denies any SOB or CP.     Low back pain with sciatica, sciatica laterality unspecified, unspecified back pain laterality, unspecified chronicity  Sciatica, right side  -Continue PTA gabapentin.   -Tramadol prn.   -Flexeril 5 mg TID.     Hypertension, unspecified type  -Continue lisinopril.   -Monitor bp.     Diabetes mellitus due to underlying condition with hyperosmolarity without coma, without long-term current use of insulin (H)  -diet controlled.     Ok for PT/OT to eval and treat.     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    Cervical high risk HPV (human papillomavirus) test positive 05/11/2023    Bilateral lower extremity edema 05/11/2023    Anemia, unspecified 04/30/2013    Allergic rhinitis, mild 05/07/2024    Hyperparathyroidism (H24) 10/07/2012    Hyperlipidemia LDL goal <70 11/19/2011    History of sepsis 03/18/2024    History of nephrolithiasis 01/13/2013    Hand pain 10/25/2014    DEISI (generalized anxiety disorder) 02/23/2016    Elevated troponin 12/03/2022    Dysthymia 09/23/2015    Degenerative disc disease, lumbar 01/05/2024     Decreased functional mobility and endurance 01/05/2024    Cough 05/17/2018    Hypertension 05/07/2024    Osteoarthritis of both hips 10/31/2023    Open wound of skin 04/09/2023    Obstructive sleep apnea 05/07/2024    Nevus of left foot 12/31/2015    Neck pain, chronic 04/18/2012    Chronic pain disorder 05/17/2018    Major depressive disorder, recurrent severe without psychotic features (H) 01/10/2023    Leukocytosis 12/03/2022    Lactic acidosis 04/29/2024    ICD (implantable cardioverter-defibrillator) in place 12/03/2022    Pre-syncope 12/03/2022    Paroxysmal atrial fibrillation (H) 10/31/2023    Atrial fibrillation with RVR (H) 04/29/2024    Pain in joint 10/25/2014    Osteopenia 07/16/2012    Osteoarthritis of both knees 06/18/2018    Type 2 diabetes mellitus with complication, without long-term current use of insulin (H) 09/16/2006    Tension type headache 03/04/2014    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    Sciatica, right side 05/11/2023    S/P gastric bypass 09/28/2012    Restless legs 04/19/2016    Vitamin D deficiency 09/19/2007    Ventricular tachycardia (H) 11/01/2022    Venous stasis dermatitis of both lower extremities 05/11/2023    COVID-19 12/01/2021    Low back pain 04/18/2012    MDD (major depressive disorder), recurrent episode, moderate (H) 02/23/2016    Primary osteoarthritis of both knees 06/18/2018     Resolved Ambulatory Problems     Diagnosis Date Noted    No Resolved Ambulatory Problems     No Additional Past Medical History     Allergies   Allergen Reactions    Aspirin Other (See Comments)     History of gastric bypass surgery    Cephalexin Other (See Comments)     Insomnia    Dust Mites Other (See Comments)     Runny nose    Ibuprofen Other (See Comments)     History of gastric bypass surgery    Nsaids Other (See Comments)     History of gastric bypass surgery       All Meds and Allergies reviewed in the record at the facility and is the most up-to-date.    Post  Discharge Medication Reconciliation Status: discharge medications reconciled, continue medications without change  Current Outpatient Medications   Medication Sig Dispense Refill    acetaminophen (ACETAMINOPHEN 8 HOUR) 650 MG CR tablet Take 650 mg by mouth every 8 hours as needed for mild pain or fever      albuterol (PROAIR HFA/PROVENTIL HFA/VENTOLIN HFA) 108 (90 Base) MCG/ACT inhaler Inhale 2 puffs into the lungs 4 times daily as needed for shortness of breath, wheezing or cough      amiodarone (PACERONE) 200 MG tablet Take 400 mg by mouth 2 times daily 2 tabs (400 mg) PO BID for 2 days then discontinue      [START ON 5/8/2024] amiodarone (PACERONE) 200 MG tablet Take 400 mg by mouth daily 2 tabs (400 mg) PO once daily for 14 days then discontinue      [START ON 5/22/2024] amiodarone (PACERONE) 200 MG tablet Take 200 mg by mouth daily      aspirin 81 MG EC tablet Take 162 mg by mouth daily      atorvastatin (LIPITOR) 80 MG tablet Take 80 mg by mouth daily      bisoprolol (ZEBETA) 5 MG tablet Take 5 mg by mouth daily      buPROPion (WELLBUTRIN) 75 MG tablet Take 120 mg by mouth 2 times daily      clonazePAM (KLONOPIN) 0.5 MG tablet Take 0.5 mg by mouth daily as needed for anxiety      cyclobenzaprine (FLEXERIL) 5 MG tablet Take 5 mg by mouth 3 times daily For 14 days then discontinue      diclofenac (VOLTAREN) 1 % topical gel Apply 2 g topically 4 times daily      diphenhydrAMINE (BENADRYL) 25 MG capsule Take 25 mg by mouth nightly as needed for itching or allergies      DULoxetine (CYMBALTA) 30 MG capsule Take 30 mg by mouth daily      fexofenadine (ALLEGRA) 60 MG tablet Take 60 mg by mouth 2 times daily as needed for allergies      furosemide (LASIX) 40 MG tablet Take 40 mg by mouth daily      gabapentin (NEURONTIN) 600 MG tablet Take 300-900 mg by mouth 3 times daily 300 mg PO every morning and afternoon; 900 mg every evening at bedtime      lisinopril (ZESTRIL) 5 MG tablet Take 5 mg by mouth daily       "magnesium oxide (MAG-OX) 400 MG tablet Take 400 mg by mouth daily      melatonin 3 MG tablet Take 3 mg by mouth nightly as needed for sleep      Multiple Vitamins-Minerals (MULTIVITAMIN ADULTS) TABS Take 1 tablet by mouth daily      nitroGLYcerin (NITROSTAT) 0.4 MG sublingual tablet Place 0.4 mg under the tongue every 5 minutes as needed for chest pain For chest pain place 1 tablet under the tongue every 5 minutes for 3 doses. If symptoms persist 5 minutes after 1st dose call 911.      spironolactone (ALDACTONE) 25 MG tablet Take 25 mg by mouth daily      traMADol HCl 25 MG TABS tablet Take 25 mg by mouth every 6 hours as needed for severe pain      traZODone (DESYREL) 50 MG tablet Take 50 mg by mouth nightly as needed for sleep      vitamin B-12 (CYANOCOBALAMIN) 1000 MCG tablet Take 1,000 mcg by mouth daily      vitamin D3 (CHOLECALCIFEROL) 1.25 MG (99722 UT) capsule Take 50,000 Units by mouth daily       No current facility-administered medications for this visit.       REVIEW OF SYSTEMS:   10 point review of systems reviewed and pertinent positives in the HPI.     PHYSICAL EXAMINATION:  Physical Exam     Vital signs: /75   Pulse 60   Temp 97.5  F (36.4  C)   Resp 16   Ht 1.715 m (5' 7.5\")   Wt 115.7 kg (255 lb)   SpO2 91%   BMI 39.35 kg/m    General: Awake, Alert, oriented x3, sitting up in bedside chair, conversant  HEENT:Pink conjunctiva, moist oral mucosa  NECK: Supple  CVS:  S1  S2, without murmur or gallop.   LUNG: Clear to auscultation, No wheezes, rales or rhonci. Cough dry.   BACK: No kyphosis of the thoracic spine  ABDOMEN: Soft, obese, non tender to palpation, with positive bowel sounds  EXTREMITIES: Moves both upper and lower extremities with generalized weakness, trace pedal edema, no calf tenderness  SKIN: Warm and dry  NEUROLOGIC: Intact, pulses palpable  PSYCHIATRIC: Pleasant affect.       Labs:  All labs reviewed in the nursing home record and Epic     > 35 minutes spent preparing for " this visit, reviewing hospital records, meds, labs, consults, imaging as well as face to face time spent with pt and collaborating with nursing.     This note has been dictated using voice recognition software. Any grammatical or context distortions are unintentional and inherent to the software    Electronically signed by: Nina Birch CNP

## 2024-05-07 NOTE — LETTER
5/7/2024        RE: Vikki Evans  [address] [id number]        [facility]    Code Status:  FULL CODE   Visit Type:   Chief Complaint   Patient presents with     TCU Admission     [facility] 4/28/2024 - 5/6/2024     Facility:  [facility] ([facility]) [[id number]]         HPI: Vikki Evans is a 63 year old female who I am seeing today for admit to the TCU. Past medical history includes   VT s/p ablation (2016), NSVT, paroxysmal atrial fibrillation/flutter, chronic HFrEF, CAD status post stents, MVR, TVR, hypertension, hyperlipidemia, NARESH, DM2, and depression. Pt presented with fatigue and nausea. She had recently been at the ER and had leukocytosis with questionable pulmonary infiltrate. She was sent home on Augmentin and Z pack for possible pneumonia. Progressive dyspnea with inappropriate ICD shock 2/t A fib with RVR. Pt found to have acute on chronic CHF with cardiogenic shock. Pt initially required O2 up to 10L. Device interrogated with mx inappropriate shocks of A fib. She was treated with IV amiodarone and Digoxin and transitioned to oral amiodarone.  DEANA done which did not demonstrate LV aneurysm. AC changed to Coumadin. She was treated with IV diuretics and transitioned to orals.     Cardiac CT showed:     No intracardiac mass or thrombus is detected.  No left atrial appendage thrombus.  Possible pseudoaneurysm versus contained rupture (1 x 0.8 cm) noted at the anterior/apical left ventricular wall.  No pericardial effusion.  Prominent apical calcification.  Biventricular ICD is present.  Left ventricular enlargement is present.     Transitional Care Course: Today pt sitting up in bedside chair. She denies any SOB or CP. She is off oxygen. She using CPAP at HS. She continues on Lasix. Trace BLE edema. She reports chronic pain in her back and legs. She continues on home gabapentin. She has prn tramadol she can also use. Pt reports some dizziness with changing positions. She  is on mx meds that could be contributory. BP and pulse appears satisfactory. She reports she is voiding adequately. No further shocks reported.       Assessment/Plan:     Atrial fibrillation with RVR (H)  S/P ICD (internal cardiac defibrillator) procedure  -Amiodarone tapering.   -Denies CP or palpitations.   -INR today 2.8.   -Coumadin Clinic to dose INR.     Acute on chronic diastolic heart failure (H)  -Lasix 40 mg every day.   -Lisinopril 5 mg every day.   -Spironolactone 12.5 mg every day.   -every day weights.   -Follow up BMP.     Coronary artery disease involving native coronary artery of native heart without angina pectoris  -Denies any SOB or CP.     Low back pain with sciatica, sciatica laterality unspecified, unspecified back pain laterality, unspecified chronicity  Sciatica, right side  -Continue PTA gabapentin.   -Tramadol prn.   -Flexeril 5 mg TID.     Hypertension, unspecified type  -Continue lisinopril.   -Monitor bp.     Diabetes mellitus due to underlying condition with hyperosmolarity without coma, without long-term current use of insulin (H)  -diet controlled.     Ok for PT/OT to eval and treat.     Active Ambulatory Problems     Diagnosis Date Noted     Acute on chronic HFrEF (heart failure with reduced ejection fraction) (H) 04/29/2024     Chronic systolic heart failure (H) 08/06/2012     Coronary atherosclerosis 05/07/2024     Contusion of shoulder 10/07/2016     Continuous severe abdominal pain 12/31/2015     Circadian rhythm sleep disorder 07/06/2017     Cervical high risk HPV (human papillomavirus) test positive 05/11/2023     Bilateral lower extremity edema 05/11/2023     Anemia, unspecified 04/30/2013     Allergic rhinitis, mild 05/07/2024     Hyperparathyroidism (H24) 10/07/2012     Hyperlipidemia LDL goal <70 11/19/2011     History of sepsis 03/18/2024     History of nephrolithiasis 01/13/2013     Hand pain 10/25/2014     DEISI (generalized anxiety disorder) 02/23/2016     Elevated  troponin 12/03/2022     Dysthymia 09/23/2015     Degenerative disc disease, lumbar 01/05/2024     Decreased functional mobility and endurance 01/05/2024     Cough 05/17/2018     Hypertension 05/07/2024     Osteoarthritis of both hips 10/31/2023     Open wound of skin 04/09/2023     Obstructive sleep apnea 05/07/2024     Nevus of left foot 12/31/2015     Neck pain, chronic 04/18/2012     Chronic pain disorder 05/17/2018     Major depressive disorder, recurrent severe without psychotic features (H) 01/10/2023     Leukocytosis 12/03/2022     Lactic acidosis 04/29/2024     ICD (implantable cardioverter-defibrillator) in place 12/03/2022     Pre-syncope 12/03/2022     Paroxysmal atrial fibrillation (H) 10/31/2023     Atrial fibrillation with RVR (H) 04/29/2024     Pain in joint 10/25/2014     Osteopenia 07/16/2012     Osteoarthritis of both knees 06/18/2018     Type 2 diabetes mellitus with complication, without long-term current use of insulin (H) 09/16/2006     Tension type headache 03/04/2014     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     Sciatica, right side 05/11/2023     S/P gastric bypass 09/28/2012     Restless legs 04/19/2016     Vitamin D deficiency 09/19/2007     Ventricular tachycardia (H) 11/01/2022     Venous stasis dermatitis of both lower extremities 05/11/2023     COVID-19 12/01/2021     Low back pain 04/18/2012     MDD (major depressive disorder), recurrent episode, moderate (H) 02/23/2016     Primary osteoarthritis of both knees 06/18/2018     Resolved Ambulatory Problems     Diagnosis Date Noted     No Resolved Ambulatory Problems     No Additional Past Medical History     Allergies   Allergen Reactions     Aspirin Other (See Comments)     History of gastric bypass surgery     Cephalexin Other (See Comments)     Insomnia     Dust Mites Other (See Comments)     Runny nose     Ibuprofen Other (See Comments)     History of gastric bypass surgery     Nsaids Other (See Comments)     History of  gastric bypass surgery       All Meds and Allergies reviewed in the record at the facility and is the most up-to-date.    Post Discharge Medication Reconciliation Status: discharge medications reconciled, continue medications without change  Current Outpatient Medications   Medication Sig Dispense Refill     acetaminophen (ACETAMINOPHEN 8 HOUR) 650 MG CR tablet Take 650 mg by mouth every 8 hours as needed for mild pain or fever       albuterol (PROAIR HFA/PROVENTIL HFA/VENTOLIN HFA) 108 (90 Base) MCG/ACT inhaler Inhale 2 puffs into the lungs 4 times daily as needed for shortness of breath, wheezing or cough       amiodarone (PACERONE) 200 MG tablet Take 400 mg by mouth 2 times daily 2 tabs (400 mg) PO BID for 2 days then discontinue       [START ON 5/8/2024] amiodarone (PACERONE) 200 MG tablet Take 400 mg by mouth daily 2 tabs (400 mg) PO once daily for 14 days then discontinue       [START ON 5/22/2024] amiodarone (PACERONE) 200 MG tablet Take 200 mg by mouth daily       aspirin 81 MG EC tablet Take 162 mg by mouth daily       atorvastatin (LIPITOR) 80 MG tablet Take 80 mg by mouth daily       bisoprolol (ZEBETA) 5 MG tablet Take 5 mg by mouth daily       buPROPion (WELLBUTRIN) 75 MG tablet Take 120 mg by mouth 2 times daily       clonazePAM (KLONOPIN) 0.5 MG tablet Take 0.5 mg by mouth daily as needed for anxiety       cyclobenzaprine (FLEXERIL) 5 MG tablet Take 5 mg by mouth 3 times daily For 14 days then discontinue       diclofenac (VOLTAREN) 1 % topical gel Apply 2 g topically 4 times daily       diphenhydrAMINE (BENADRYL) 25 MG capsule Take 25 mg by mouth nightly as needed for itching or allergies       DULoxetine (CYMBALTA) 30 MG capsule Take 30 mg by mouth daily       fexofenadine (ALLEGRA) 60 MG tablet Take 60 mg by mouth 2 times daily as needed for allergies       furosemide (LASIX) 40 MG tablet Take 40 mg by mouth daily       gabapentin (NEURONTIN) 600 MG tablet Take 300-900 mg by mouth 3 times daily 300  "mg PO every morning and afternoon; 900 mg every evening at bedtime       lisinopril (ZESTRIL) 5 MG tablet Take 5 mg by mouth daily       magnesium oxide (MAG-OX) 400 MG tablet Take 400 mg by mouth daily       melatonin 3 MG tablet Take 3 mg by mouth nightly as needed for sleep       Multiple Vitamins-Minerals (MULTIVITAMIN ADULTS) TABS Take 1 tablet by mouth daily       nitroGLYcerin (NITROSTAT) 0.4 MG sublingual tablet Place 0.4 mg under the tongue every 5 minutes as needed for chest pain For chest pain place 1 tablet under the tongue every 5 minutes for 3 doses. If symptoms persist 5 minutes after 1st dose call 911.       spironolactone (ALDACTONE) 25 MG tablet Take 25 mg by mouth daily       traMADol HCl 25 MG TABS tablet Take 25 mg by mouth every 6 hours as needed for severe pain       traZODone (DESYREL) 50 MG tablet Take 50 mg by mouth nightly as needed for sleep       vitamin B-12 (CYANOCOBALAMIN) 1000 MCG tablet Take 1,000 mcg by mouth daily       vitamin D3 (CHOLECALCIFEROL) 1.25 MG (20772 UT) capsule Take 50,000 Units by mouth daily       No current facility-administered medications for this visit.       REVIEW OF SYSTEMS:   10 point review of systems reviewed and pertinent positives in the HPI.     PHYSICAL EXAMINATION:  Physical Exam     Vital signs: /75   Pulse 60   Temp 97.5  F (36.4  C)   Resp 16   Ht 1.715 m (5' 7.5\")   Wt 115.7 kg (255 lb)   SpO2 91%   BMI 39.35 kg/m    General: Awake, Alert, oriented x3, sitting up in bedside chair, conversant  HEENT:Pink conjunctiva, moist oral mucosa  NECK: Supple  CVS:  S1  S2, without murmur or gallop.   LUNG: Clear to auscultation, No wheezes, rales or rhonci. Cough dry.   BACK: No kyphosis of the thoracic spine  ABDOMEN: Soft, obese, non tender to palpation, with positive bowel sounds  EXTREMITIES: Moves both upper and lower extremities with generalized weakness, trace pedal edema, no calf tenderness  SKIN: Warm and dry  NEUROLOGIC: Intact, " pulses palpable  PSYCHIATRIC: Pleasant affect.       Labs:  All labs reviewed in the nursing home record and Epic     > 35 minutes spent preparing for this visit, reviewing hospital records, meds, labs, consults, imaging as well as face to face time spent with pt and collaborating with nursing.     This note has been dictated using voice recognition software. Any grammatical or context distortions are unintentional and inherent to the software    Electronically signed by: Nina Birch CNP       Sincerely,        Nina Birch NP

## 2024-05-09 NOTE — PROGRESS NOTES
ANTICOAGULATION MANAGEMENT     Vikki Evans 63 year old female is on warfarin with supratherapeutic INR result. (Goal INR 2.0-3.0)    Recent labs: (last 7 days)     05/09/24  0000   INR 3.3*     ASSESSMENT     Source(s): Chart Review and Template     Warfarin doses taken: Warfarin taken as instructed  Diet: No new diet changes identified  Medication/supplement changes: Per hospital discharge pt to start Amiodarone on 5/2/24: Take 2 Tablets (400 mg) by mouth two times daily for 3 days, THEN 2 Tablets (400 mg) once daily for 14 day  New illness, injury, or hospitalization: No  Signs or symptoms of bleeding or clotting: No  Previous result: Therapeutic x1. New Start.   Additional findings: New start on day 10 of warfarin - consulted with [facility] due to amiodarone loading dose starting on 5/2/24.        PLAN     Recommended plan for ongoing change(s) affecting INR     Dosing Instructions: decrease your warfarin dose (21.4% change) with next INR in 4 days       Summary  As of 5/9/2024      Full warfarin instructions:  1 mg every Sun, Tue, Thu; 2 mg all other days   Next INR check:  5/13/2024               Detailed voice message left for [facility] facility nurse with dosing instructions and follow up date.     Contact [phone]  to schedule and with any changes, questions or concerns.     Education provided:   Please call back if any changes to your diet, medications or how you've been taking warfarin    Plan made with [facility] Pharmacist Nieves Gordon, RN  Anticoagulation Clinic  5/9/2024

## 2024-05-09 NOTE — PROGRESS NOTES
Incoming fax from [facility]    INR 3.1 on 5/9/24    Maynor Gordon RN

## 2024-05-13 NOTE — PROGRESS NOTES
ANTICOAGULATION MANAGEMENT     Vikki Evans 63 year old female is on warfarin with therapeutic INR result. (Goal INR 2.0-3.0)    Recent labs: (last 7 days)     05/13/24  0000   INR 2.7*       ASSESSMENT     Source(s): Chart Review and Home Care/Facility Nurse     Warfarin doses taken: Warfarin taken as instructed  Diet: No new diet changes identified  Medication/supplement changes:   Per hospital discharge pt to start Amiodarone on 5/2/24: Take 2 Tablets (400 mg) by mouth two times daily for 3 days, THEN 2 Tablets (400 mg) once daily for 14 day  New illness, injury, or hospitalization: No  Signs or symptoms of bleeding or clotting: No  Previous result: Supratherapeutic  Additional findings: New start on day 14 of warfarin  Pt had appointment with [facility] cardiologist on 5/10/24, per [facility] note discussion with potential of discontinuing amiodarone and having an ablation completed but no formal decision made. Advised TCU nurse to notify [facility] if they are notified of any changes regarding this.        PLAN     Recommended plan for no diet, medication or health factor changes affecting INR     Dosing Instructions: Continue your current warfarin dose with next INR in 4 days       Summary  As of 5/13/2024      Full warfarin instructions:  1 mg every Sun, Tue, Thu; 2 mg all other days   Next INR check:  5/17/2024               Telephone call with Nieves Barraza of [facility] facility nurse who verbalizes understanding and agrees to plan    Orders given to  Homecare nurse/facility to recheck    Education provided:   Goal range and lab monitoring: goal range and significance of current result and Importance of therapeutic range  Contact [phone]  with any changes, questions or concerns.     Plan made with [facility] Pharmacist Nieves Gordon, RN  Anticoagulation Clinic  5/13/2024    _______________________________________________________________________     Anticoagulation Episode Summary       Current INR goal:  2.0-3.0    TTR:  --   Target end date:  Indefinite   Send INR reminders to:  TAMMI MARUQES    Indications    Atrial fibrillation with RVR (H) [I48.91]             Comments:  A-Fib             Anticoagulation Care Providers       Provider Role Specialty Phone number    Nina Birch NP Referring Nurse Practitioner [phone]

## 2024-05-14 NOTE — LETTER
5/14/2024        RE: Vikki Evans  [address] [address]         [facility]    Code Status:  FULL CODE   Visit Type:   Chief Complaint   Patient presents with     TCU Follow Up     Facility:  [facility] ([facility]) [[id number]]         HPI: Vikki Evans is a 63 year old female who I am seeing today for follow up on the TCU. Past medical history includes   VT s/p ablation (2016), NSVT, paroxysmal atrial fibrillation/flutter, chronic HFrEF, CAD status post stents, MVR, TVR, hypertension, hyperlipidemia, NARESH, DM2, and depression. Pt presented with fatigue and nausea. She had recently been at the ER and had leukocytosis with questionable pulmonary infiltrate. She was sent home on Augmentin and Z pack for possible pneumonia. Progressive dyspnea with inappropriate ICD shock 2/t A fib with RVR. Pt found to have acute on chronic CHF with cardiogenic shock. Pt initially required O2 up to 10L. Device interrogated with mx inappropriate shocks of A fib. She was treated with IV amiodarone and Digoxin and transitioned to oral amiodarone.  DEANA done which did not demonstrate LV aneurysm. AC changed to Coumadin. She was treated with IV diuretics and transitioned to orals.     Cardiac CT showed:     No intracardiac mass or thrombus is detected.  No left atrial appendage thrombus.  Possible pseudoaneurysm versus contained rupture (1 x 0.8 cm) noted at the anterior/apical left ventricular wall.  No pericardial effusion.  Prominent apical calcification.  Biventricular ICD is present.  Left ventricular enlargement is present.     Transitional Care Course: Today pt sitting up in bedside chair. Pt reports pain in her lower back. Hx of CBP with slipped disc. She denies any radiculopathy today. She continues on gabapentin, tramadol and flexeril. She is followed out pt by Pain clinic. Pt denies any CP, palpitations or SOB. Nursing staff reporting low bp. Her antihypertensives have been held X 3 days. No  evidence of fluid overload.       Assessment/Plan:     Atrial fibrillation with RVR (H)  S/P ICD (internal cardiac defibrillator) procedure  -Amiodarone tapering.   -Denies CP or palpitations.   -INR 2.8.   -Coumadin Clinic to dose INR.     Acute on chronic diastolic heart failure (H)  -Decrease Lasix 20 mg every day. (Low bp).   -Lisinopril 5 mg every day.   -Spironolactone 12.5 mg every day.   -every day weights.   -Follow up BMP with Creatine 1.37 per baseline.     Coronary artery disease involving native coronary artery of native heart without angina pectoris  -Denies any SOB or CP.     Low back pain with sciatica, sciatica laterality unspecified, unspecified back pain laterality, unspecified chronicity  Sciatica, right side  -Continue PTA gabapentin.   -Tramadol prn.   -Flexeril 5 mg TID.   -Pt followed out pt by Pain Team.     Hypertension, unspecified type  -Continue lisinopril.   -Monitor bp.     Diabetes mellitus due to underlying condition with hyperosmolarity without coma, without long-term current use of insulin (H)  -diet controlled.     Active Ambulatory Problems     Diagnosis Date Noted     Acute on chronic HFrEF (heart failure with reduced ejection fraction) (H) 04/29/2024     Chronic systolic heart failure (H) 08/06/2012     Coronary atherosclerosis 05/07/2024     Contusion of shoulder 10/07/2016     Continuous severe abdominal pain 12/31/2015     Circadian rhythm sleep disorder 07/06/2017     Cervical high risk HPV (human papillomavirus) test positive 05/11/2023     Bilateral lower extremity edema 05/11/2023     Anemia, unspecified 04/30/2013     Allergic rhinitis, mild 05/07/2024     Hyperparathyroidism (H24) 10/07/2012     Hyperlipidemia LDL goal <70 11/19/2011     History of sepsis 03/18/2024     History of nephrolithiasis 01/13/2013     Hand pain 10/25/2014     DEISI (generalized anxiety disorder) 02/23/2016     Elevated troponin 12/03/2022     Dysthymia 09/23/2015     Degenerative disc disease,  lumbar 01/05/2024     Decreased functional mobility and endurance 01/05/2024     Cough 05/17/2018     Hypertension 05/07/2024     Osteoarthritis of both hips 10/31/2023     Open wound of skin 04/09/2023     Obstructive sleep apnea 05/07/2024     Nevus of left foot 12/31/2015     Neck pain, chronic 04/18/2012     Chronic pain disorder 05/17/2018     Major depressive disorder, recurrent severe without psychotic features (H) 01/10/2023     Leukocytosis 12/03/2022     Lactic acidosis 04/29/2024     ICD (implantable cardioverter-defibrillator) in place 12/03/2022     Pre-syncope 12/03/2022     Paroxysmal atrial fibrillation (H) 10/31/2023     Atrial fibrillation with RVR (H) 04/29/2024     Pain in joint 10/25/2014     Osteopenia 07/16/2012     Osteoarthritis of both knees 06/18/2018     Type 2 diabetes mellitus with complication, without long-term current use of insulin (H) 09/16/2006     Tension type headache 03/04/2014     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     Sciatica, right side 05/11/2023     S/P gastric bypass 09/28/2012     Restless legs 04/19/2016     Vitamin D deficiency 09/19/2007     Ventricular tachycardia (H) 11/01/2022     Venous stasis dermatitis of both lower extremities 05/11/2023     COVID-19 12/01/2021     Low back pain 04/18/2012     MDD (major depressive disorder), recurrent episode, moderate (H) 02/23/2016     Primary osteoarthritis of both knees 06/18/2018     Resolved Ambulatory Problems     Diagnosis Date Noted     No Resolved Ambulatory Problems     No Additional Past Medical History     Allergies   Allergen Reactions     Aspirin Other (See Comments)     History of gastric bypass surgery     Cats Other (See Comments)     Runny nose     Cephalexin Other (See Comments)     Insomnia     Dust Mites Other (See Comments)     Runny nose     Ibuprofen Other (See Comments)     History of gastric bypass surgery     Nsaids Other (See Comments)     History of gastric bypass surgery        All Meds and Allergies reviewed in the record at the facility and is the most up-to-date.    Post Discharge Medication Reconciliation Status: discharge medications reconciled, continue medications without change  Current Outpatient Medications   Medication Sig Dispense Refill     acetaminophen (ACETAMINOPHEN 8 HOUR) 650 MG CR tablet Take 650 mg by mouth every 8 hours as needed for mild pain or fever       albuterol (PROAIR HFA/PROVENTIL HFA/VENTOLIN HFA) 108 (90 Base) MCG/ACT inhaler Inhale 2 puffs into the lungs 4 times daily as needed for shortness of breath, wheezing or cough       amiodarone (PACERONE) 200 MG tablet Take 400 mg by mouth 2 times daily 2 tabs (400 mg) PO BID for 2 days then discontinue       amiodarone (PACERONE) 200 MG tablet Take 400 mg by mouth daily 2 tabs (400 mg) PO once daily for 14 days then discontinue       [START ON 5/22/2024] amiodarone (PACERONE) 200 MG tablet Take 200 mg by mouth daily       aspirin 81 MG EC tablet Take 162 mg by mouth daily       atorvastatin (LIPITOR) 80 MG tablet Take 80 mg by mouth daily       bisoprolol (ZEBETA) 5 MG tablet Take 5 mg by mouth daily       buPROPion (WELLBUTRIN) 75 MG tablet Take 120 mg by mouth 2 times daily       clonazePAM (KLONOPIN) 0.5 MG tablet Take 0.5 mg by mouth daily as needed for anxiety       cyclobenzaprine (FLEXERIL) 5 MG tablet Take 5 mg by mouth 3 times daily For 14 days then discontinue       diclofenac (VOLTAREN) 1 % topical gel Apply 2 g topically 4 times daily       diphenhydrAMINE (BENADRYL) 25 MG capsule Take 25 mg by mouth nightly as needed for itching or allergies       DULoxetine (CYMBALTA) 30 MG capsule Take 30 mg by mouth daily       fexofenadine (ALLEGRA) 60 MG tablet Take 60 mg by mouth 2 times daily as needed for allergies       furosemide (LASIX) 40 MG tablet Take 20 mg by mouth daily       gabapentin (NEURONTIN) 600 MG tablet Take 300-900 mg by mouth 3 times daily 300 mg PO every morning and afternoon; 900 mg  "every evening at bedtime       lisinopril (ZESTRIL) 5 MG tablet Take 5 mg by mouth daily       magnesium oxide (MAG-OX) 400 MG tablet Take 400 mg by mouth daily       melatonin 3 MG tablet Take 3 mg by mouth nightly as needed for sleep       Multiple Vitamins-Minerals (MULTIVITAMIN ADULTS) TABS Take 1 tablet by mouth daily       nitroGLYcerin (NITROSTAT) 0.4 MG sublingual tablet Place 0.4 mg under the tongue every 5 minutes as needed for chest pain For chest pain place 1 tablet under the tongue every 5 minutes for 3 doses. If symptoms persist 5 minutes after 1st dose call 911.       spironolactone (ALDACTONE) 25 MG tablet Take 25 mg by mouth daily       traMADol HCl 25 MG TABS tablet Take 1 tablet (25 mg) by mouth every 6 hours as needed (pain) 30 tablet 0     traZODone (DESYREL) 50 MG tablet Take 50 mg by mouth nightly as needed for sleep       vitamin B-12 (CYANOCOBALAMIN) 1000 MCG tablet Take 1,000 mcg by mouth daily       vitamin D3 (CHOLECALCIFEROL) 1.25 MG (69769 UT) capsule Take 50,000 Units by mouth daily       No current facility-administered medications for this visit.       REVIEW OF SYSTEMS:   10 point review of systems reviewed and pertinent positives in the HPI.     PHYSICAL EXAMINATION:  Physical Exam     Vital signs: /65   Pulse 60   Temp 97  F (36.1  C)   Resp 18   Ht 1.715 m (5' 7.5\")   Wt 114.6 kg (252 lb 9.6 oz)   SpO2 94%   BMI 38.98 kg/m    General: Awake, Alert, oriented x3, sitting up in bedside chair, conversant  HEENT:Pink conjunctiva, moist oral mucosa  NECK: Supple  CVS:  S1  S2, without murmur or gallop.   LUNG: Clear to auscultation, No wheezes, rales or rhonci. Cough dry.   BACK: No kyphosis of the thoracic spine  ABDOMEN: Soft, obese, non tender to palpation, with positive bowel sounds  EXTREMITIES: Moves both upper and lower extremities with generalized weakness, trace pedal edema, no calf tenderness  SKIN: Warm and dry  NEUROLOGIC: Intact, pulses palpable  PSYCHIATRIC: " Pleasant affect.       Labs:  All labs reviewed in the nursing home record and Epic     > 35 minutes spent preparing for this visit, reviewing hospital records, meds, labs, consults, imaging as well as face to face time spent with pt and collaborating with nursing.     This note has been dictated using voice recognition software. Any grammatical or context distortions are unintentional and inherent to the software    Electronically signed by: Nina Birch CNP       Sincerely,        Nina Birch NP

## 2024-05-14 NOTE — PROGRESS NOTES
[facility] GERIATRIC SERVICES    Code Status:  FULL CODE   Visit Type:   Chief Complaint   Patient presents with    TCU Follow Up     Facility:  [facility] ([facility]) [[id number]]         HPI: Vikki Evans is a 63 year old female who I am seeing today for follow up on the TCU. Past medical history includes   VT s/p ablation (2016), NSVT, paroxysmal atrial fibrillation/flutter, chronic HFrEF, CAD status post stents, MVR, TVR, hypertension, hyperlipidemia, NARESH, DM2, and depression. Pt presented with fatigue and nausea. She had recently been at the ER and had leukocytosis with questionable pulmonary infiltrate. She was sent home on Augmentin and Z pack for possible pneumonia. Progressive dyspnea with inappropriate ICD shock 2/t A fib with RVR. Pt found to have acute on chronic CHF with cardiogenic shock. Pt initially required O2 up to 10L. Device interrogated with mx inappropriate shocks of A fib. She was treated with IV amiodarone and Digoxin and transitioned to oral amiodarone.  DEANA done which did not demonstrate LV aneurysm. AC changed to Coumadin. She was treated with IV diuretics and transitioned to orals.     Cardiac CT showed:     No intracardiac mass or thrombus is detected.  No left atrial appendage thrombus.  Possible pseudoaneurysm versus contained rupture (1 x 0.8 cm) noted at the anterior/apical left ventricular wall.  No pericardial effusion.  Prominent apical calcification.  Biventricular ICD is present.  Left ventricular enlargement is present.     Transitional Care Course: Today pt sitting up in bedside chair. Pt reports pain in her lower back. Hx of CBP with slipped disc. She denies any radiculopathy today. She continues on gabapentin, tramadol and flexeril. She is followed out pt by Pain clinic. Pt denies any CP, palpitations or SOB. Nursing staff reporting low bp. Her antihypertensives have been held X 3 days. No evidence of fluid overload.       Assessment/Plan:     Atrial fibrillation with RVR  (H)  S/P ICD (internal cardiac defibrillator) procedure  -Amiodarone tapering.   -Denies CP or palpitations.   -INR 2.8.   -Coumadin Clinic to dose INR.     Acute on chronic diastolic heart failure (H)  -Decrease Lasix 20 mg every day. (Low bp).   -Lisinopril 5 mg every day.   -Spironolactone 12.5 mg every day.   -every day weights.   -Follow up BMP with Creatine 1.37 per baseline.     Coronary artery disease involving native coronary artery of native heart without angina pectoris  -Denies any SOB or CP.     Low back pain with sciatica, sciatica laterality unspecified, unspecified back pain laterality, unspecified chronicity  Sciatica, right side  -Continue PTA gabapentin.   -Tramadol prn.   -Flexeril 5 mg TID.   -Pt followed out pt by Pain Team.     Hypertension, unspecified type  -Continue lisinopril.   -Monitor bp.     Diabetes mellitus due to underlying condition with hyperosmolarity without coma, without long-term current use of insulin (H)  -diet controlled.     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    Cervical high risk HPV (human papillomavirus) test positive 05/11/2023    Bilateral lower extremity edema 05/11/2023    Anemia, unspecified 04/30/2013    Allergic rhinitis, mild 05/07/2024    Hyperparathyroidism (H24) 10/07/2012    Hyperlipidemia LDL goal <70 11/19/2011    History of sepsis 03/18/2024    History of nephrolithiasis 01/13/2013    Hand pain 10/25/2014    DEISI (generalized anxiety disorder) 02/23/2016    Elevated troponin 12/03/2022    Dysthymia 09/23/2015    Degenerative disc disease, lumbar 01/05/2024    Decreased functional mobility and endurance 01/05/2024    Cough 05/17/2018     Hypertension 05/07/2024    Osteoarthritis of both hips 10/31/2023    Open wound of skin 04/09/2023    Obstructive sleep apnea 05/07/2024    Nevus of left foot 12/31/2015    Neck pain, chronic 04/18/2012    Chronic pain disorder 05/17/2018    Major depressive disorder, recurrent severe without psychotic features (H) 01/10/2023    Leukocytosis 12/03/2022    Lactic acidosis 04/29/2024    ICD (implantable cardioverter-defibrillator) in place 12/03/2022    Pre-syncope 12/03/2022    Paroxysmal atrial fibrillation (H) 10/31/2023    Atrial fibrillation with RVR (H) 04/29/2024    Pain in joint 10/25/2014    Osteopenia 07/16/2012    Osteoarthritis of both knees 06/18/2018    Type 2 diabetes mellitus with complication, without long-term current use of insulin (H) 09/16/2006    Tension type headache 03/04/2014    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    Sciatica, right side 05/11/2023    S/P gastric bypass 09/28/2012    Restless legs 04/19/2016    Vitamin D deficiency 09/19/2007    Ventricular tachycardia (H) 11/01/2022    Venous stasis dermatitis of both lower extremities 05/11/2023    COVID-19 12/01/2021    Low back pain 04/18/2012    MDD (major depressive disorder), recurrent episode, moderate (H) 02/23/2016    Primary osteoarthritis of both knees 06/18/2018     Resolved Ambulatory Problems     Diagnosis Date Noted    No Resolved Ambulatory Problems     No Additional Past Medical History     Allergies   Allergen Reactions    Aspirin Other (See Comments)     History of gastric bypass surgery    Cats Other (See Comments)     Runny nose    Cephalexin Other (See Comments)     Insomnia    Dust Mites Other (See Comments)     Runny nose    Ibuprofen Other (See Comments)     History of gastric bypass surgery    Nsaids Other (See Comments)     History of gastric bypass surgery       All Meds and Allergies reviewed in the record at the facility and is the most up-to-date.    Post Discharge Medication Reconciliation  Status: discharge medications reconciled, continue medications without change  Current Outpatient Medications   Medication Sig Dispense Refill    acetaminophen (ACETAMINOPHEN 8 HOUR) 650 MG CR tablet Take 650 mg by mouth every 8 hours as needed for mild pain or fever      albuterol (PROAIR HFA/PROVENTIL HFA/VENTOLIN HFA) 108 (90 Base) MCG/ACT inhaler Inhale 2 puffs into the lungs 4 times daily as needed for shortness of breath, wheezing or cough      amiodarone (PACERONE) 200 MG tablet Take 400 mg by mouth 2 times daily 2 tabs (400 mg) PO BID for 2 days then discontinue      amiodarone (PACERONE) 200 MG tablet Take 400 mg by mouth daily 2 tabs (400 mg) PO once daily for 14 days then discontinue      [START ON 5/22/2024] amiodarone (PACERONE) 200 MG tablet Take 200 mg by mouth daily      aspirin 81 MG EC tablet Take 162 mg by mouth daily      atorvastatin (LIPITOR) 80 MG tablet Take 80 mg by mouth daily      bisoprolol (ZEBETA) 5 MG tablet Take 5 mg by mouth daily      buPROPion (WELLBUTRIN) 75 MG tablet Take 120 mg by mouth 2 times daily      clonazePAM (KLONOPIN) 0.5 MG tablet Take 0.5 mg by mouth daily as needed for anxiety      cyclobenzaprine (FLEXERIL) 5 MG tablet Take 5 mg by mouth 3 times daily For 14 days then discontinue      diclofenac (VOLTAREN) 1 % topical gel Apply 2 g topically 4 times daily      diphenhydrAMINE (BENADRYL) 25 MG capsule Take 25 mg by mouth nightly as needed for itching or allergies      DULoxetine (CYMBALTA) 30 MG capsule Take 30 mg by mouth daily      fexofenadine (ALLEGRA) 60 MG tablet Take 60 mg by mouth 2 times daily as needed for allergies      furosemide (LASIX) 40 MG tablet Take 20 mg by mouth daily      gabapentin (NEURONTIN) 600 MG tablet Take 300-900 mg by mouth 3 times daily 300 mg PO every morning and afternoon; 900 mg every evening at bedtime      lisinopril (ZESTRIL) 5 MG tablet Take 5 mg by mouth daily      magnesium oxide (MAG-OX) 400 MG tablet Take 400 mg by mouth  "daily      melatonin 3 MG tablet Take 3 mg by mouth nightly as needed for sleep      Multiple Vitamins-Minerals (MULTIVITAMIN ADULTS) TABS Take 1 tablet by mouth daily      nitroGLYcerin (NITROSTAT) 0.4 MG sublingual tablet Place 0.4 mg under the tongue every 5 minutes as needed for chest pain For chest pain place 1 tablet under the tongue every 5 minutes for 3 doses. If symptoms persist 5 minutes after 1st dose call 911.      spironolactone (ALDACTONE) 25 MG tablet Take 25 mg by mouth daily      traMADol HCl 25 MG TABS tablet Take 1 tablet (25 mg) by mouth every 6 hours as needed (pain) 30 tablet 0    traZODone (DESYREL) 50 MG tablet Take 50 mg by mouth nightly as needed for sleep      vitamin B-12 (CYANOCOBALAMIN) 1000 MCG tablet Take 1,000 mcg by mouth daily      vitamin D3 (CHOLECALCIFEROL) 1.25 MG (07528 UT) capsule Take 50,000 Units by mouth daily       No current facility-administered medications for this visit.       REVIEW OF SYSTEMS:   10 point review of systems reviewed and pertinent positives in the HPI.     PHYSICAL EXAMINATION:  Physical Exam     Vital signs: /65   Pulse 60   Temp 97  F (36.1  C)   Resp 18   Ht 1.715 m (5' 7.5\")   Wt 114.6 kg (252 lb 9.6 oz)   SpO2 94%   BMI 38.98 kg/m    General: Awake, Alert, oriented x3, sitting up in bedside chair, conversant  HEENT:Pink conjunctiva, moist oral mucosa  NECK: Supple  CVS:  S1  S2, without murmur or gallop.   LUNG: Clear to auscultation, No wheezes, rales or rhonci. Cough dry.   BACK: No kyphosis of the thoracic spine  ABDOMEN: Soft, obese, non tender to palpation, with positive bowel sounds  EXTREMITIES: Moves both upper and lower extremities with generalized weakness, trace pedal edema, no calf tenderness  SKIN: Warm and dry  NEUROLOGIC: Intact, pulses palpable  PSYCHIATRIC: Pleasant affect.       Labs:  All labs reviewed in the nursing home record and Epic     > 35 minutes spent preparing for this visit, reviewing hospital records, " meds, labs, consults, imaging as well as face to face time spent with pt and collaborating with nursing.     This note has been dictated using voice recognition software. Any grammatical or context distortions are unintentional and inherent to the software    Electronically signed by: Nina Birch CNP

## 2024-05-17 NOTE — PROGRESS NOTES
ANTICOAGULATION MANAGEMENT     Vikki Evans 63 year old female is on warfarin with therapeutic INR result. (Goal INR 2.0-3.0)    Recent labs: (last 7 days)     05/17/24  0000   INR 2.9*       ASSESSMENT     Source(s): Chart Review and Home Care/Facility Nurse     Warfarin doses taken: Warfarin taken as instructed  Diet: No new diet changes identified  Medication/supplement changes:  amiodarone 400 mg daily for 14 days started on 5/8/2024 subsequent INRs may be increased. Closer INR monitoring recommended.  New illness, injury, or hospitalization: No  Signs or symptoms of bleeding or clotting: No  Previous result: Therapeutic last visit; previously outside of goal range  Additional findings: New start on day 18 of warfarin       PLAN     Recommended plan for no diet, medication or health factor changes affecting INR     Dosing Instructions: decrease your warfarin dose (9.1% change) with next INR in 4 days       Summary  As of 5/17/2024      Full warfarin instructions:  2 mg every Mon, Wed, Sat; 1 mg all other days   Next INR check:  5/21/2024               Telephone call with [facility] [place] nurse (medical care for seniors) who verbalizes understanding and agrees to plan    Orders given to  Homecare nurse/facility to recheck    Education provided:   Please call back if any changes to your diet, medications or how you've been taking warfarin    Plan made with [facility] Pharmacist Nieves Rachel, RN  Anticoagulation Clinic  5/17/2024    _______________________________________________________________________     Anticoagulation Episode Summary       Current INR goal:  2.0-3.0   TTR:  --   Target end date:  Indefinite   Send INR reminders to:  TAMMI MARQUES    Indications    Atrial fibrillation with RVR (H) [I48.91]             Comments:  A-Fib             Anticoagulation Care Providers       Provider Role Specialty Phone number    Nina Birch NP Referring Nurse Practitioner  [phone]             Hide Include Location In Plan Question?: No Detail Level: Zone Detail Level: Generalized

## 2024-05-20 NOTE — LETTER
5/20/2024        RE: Vikki Evans  [address] [address]         HEALTH GERIATRIC SERVICES    Code Status:  FULL CODE   Visit Type:   Chief Complaint   Patient presents with     TCU Follow Up     Facility:  [facility] ([facility]) [[id number]]         HPI: Vikki Evans is a 63 year old female who I am seeing today for follow up on the TCU. Past medical history includes   VT s/p ablation (2016), NSVT, paroxysmal atrial fibrillation/flutter, chronic HFrEF, CAD status post stents, MVR, TVR, hypertension, hyperlipidemia, NARESH, DM2, and depression. Pt presented with fatigue and nausea. She had recently been at the ER and had leukocytosis with questionable pulmonary infiltrate. She was sent home on Augmentin and Z pack for possible pneumonia. Progressive dyspnea with inappropriate ICD shock 2/t A fib with RVR. Pt found to have acute on chronic CHF with cardiogenic shock. Pt initially required O2 up to 10L. Device interrogated with mx inappropriate shocks of A fib. She was treated with IV amiodarone and Digoxin and transitioned to oral amiodarone.  DEANA done which did not demonstrate LV aneurysm. AC changed to Coumadin. She was treated with IV diuretics and transitioned to orals.     Cardiac CT showed:     No intracardiac mass or thrombus is detected.  No left atrial appendage thrombus.  Possible pseudoaneurysm versus contained rupture (1 x 0.8 cm) noted at the anterior/apical left ventricular wall.  No pericardial effusion.  Prominent apical calcification.  Biventricular ICD is present.  Left ventricular enlargement is present.     Transitional Care Course: Today pt sitting up in bed.  Patient had a fall earlier this a.m. attempting to self shower.  She slipped in the bathroom.  She denies hitting her head.  She is reporting right hip pain.  Pain with movement.  She does have a history of chronic low back pain with radiculopathy.  She continues on tramadol, gabapentin and Flexeril.  Patient thinks  "the year is 2014.  Poor memory recall.  Nursing staff reported hallucinations earlier in the a.m.  She is afebrile.  No shortness of breath chest pain or palpitations.  Vital signs are stable.  Other than cognitive impairment neuro is intact.  Family reported that \"patient often gets confused when her thyroid is off\".       Assessment/Plan:     Fall  Right hip pain   Hallucinations  -Xray 2 view of right hip.   -Continue tylenol and tramadol for pain.   -Obtain UA/UC.   -Check CBC, BMP, TSH, T3 and T4.     Atrial fibrillation with RVR (H)  S/P ICD (internal cardiac defibrillator) procedure  -Amiodarone tapering.   -Denies CP or palpitations.   -INR 2.8.   -Coumadin Clinic to dose INR.     Acute on chronic diastolic heart failure (H)  -Decrease Lasix 20 mg every day. (Low bp).   -Lisinopril 5 mg every day.   -Spironolactone 12.5 mg every day.   -every day weights.   -Follow up BMP with Creatine 1.37 per baseline.     Low back pain with sciatica, sciatica laterality unspecified, unspecified back pain laterality, unspecified chronicity  Sciatica, right side  -Continue PTA gabapentin.   -Tramadol prn.   -Flexeril 5 mg TID.   -Pt followed out pt by Pain Team.     Hypertension, unspecified type  -Continue lisinopril.   -Monitor bp.     Diabetes mellitus due to underlying condition with hyperosmolarity without coma, without long-term current use of insulin (H)  -diet controlled.     Active Ambulatory Problems     Diagnosis Date Noted     Acute on chronic HFrEF (heart failure with reduced ejection fraction) (H) 04/29/2024     Chronic systolic heart failure (H) 08/06/2012     Coronary atherosclerosis 05/07/2024     Contusion of shoulder 10/07/2016     Continuous severe abdominal pain 12/31/2015     Circadian rhythm sleep disorder 07/06/2017     Cervical high risk HPV (human papillomavirus) test positive 05/11/2023     Bilateral lower extremity edema 05/11/2023     Anemia, unspecified 04/30/2013     Allergic rhinitis, mild " 05/07/2024     Hyperparathyroidism (H24) 10/07/2012     Hyperlipidemia LDL goal <70 11/19/2011     History of sepsis 03/18/2024     History of nephrolithiasis 01/13/2013     Hand pain 10/25/2014     DEISI (generalized anxiety disorder) 02/23/2016     Elevated troponin 12/03/2022     Dysthymia 09/23/2015     Degenerative disc disease, lumbar 01/05/2024     Decreased functional mobility and endurance 01/05/2024     Cough 05/17/2018     Hypertension 05/07/2024     Osteoarthritis of both hips 10/31/2023     Open wound of skin 04/09/2023     Obstructive sleep apnea 05/07/2024     Nevus of left foot 12/31/2015     Neck pain, chronic 04/18/2012     Chronic pain disorder 05/17/2018     Major depressive disorder, recurrent severe without psychotic features (H) 01/10/2023     Leukocytosis 12/03/2022     Lactic acidosis 04/29/2024     ICD (implantable cardioverter-defibrillator) in place 12/03/2022     Pre-syncope 12/03/2022     Paroxysmal atrial fibrillation (H) 10/31/2023     Atrial fibrillation with RVR (H) 04/29/2024     Pain in joint 10/25/2014     Osteopenia 07/16/2012     Osteoarthritis of both knees 06/18/2018     Type 2 diabetes mellitus with complication, without long-term current use of insulin (H) 09/16/2006     Tension type headache 03/04/2014     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     Sciatica, right side 05/11/2023     S/P gastric bypass 09/28/2012     Restless legs 04/19/2016     Vitamin D deficiency 09/19/2007     Ventricular tachycardia (H) 11/01/2022     Venous stasis dermatitis of both lower extremities 05/11/2023     COVID-19 12/01/2021     Low back pain 04/18/2012     MDD (major depressive disorder), recurrent episode, moderate (H) 02/23/2016     Primary osteoarthritis of both knees 06/18/2018     Resolved Ambulatory Problems     Diagnosis Date Noted     No Resolved Ambulatory Problems     No Additional Past Medical History     Allergies   Allergen Reactions     Aspirin Other (See Comments)      History of gastric bypass surgery     Cats Other (See Comments)     Runny nose     Cephalexin Other (See Comments)     Insomnia     Dust Mites Other (See Comments)     Runny nose     Ibuprofen Other (See Comments)     History of gastric bypass surgery     Nsaids Other (See Comments)     History of gastric bypass surgery       All Meds and Allergies reviewed in the record at the facility and is the most up-to-date.    Post Discharge Medication Reconciliation Status: discharge medications reconciled, continue medications without change  Current Outpatient Medications   Medication Sig Dispense Refill     acetaminophen (ACETAMINOPHEN 8 HOUR) 650 MG CR tablet Take 650 mg by mouth every 8 hours as needed for mild pain or fever       albuterol (PROAIR HFA/PROVENTIL HFA/VENTOLIN HFA) 108 (90 Base) MCG/ACT inhaler Inhale 2 puffs into the lungs 4 times daily as needed for shortness of breath, wheezing or cough       amiodarone (PACERONE) 200 MG tablet Take 400 mg by mouth 2 times daily 2 tabs (400 mg) PO BID for 2 days then discontinue       amiodarone (PACERONE) 200 MG tablet Take 400 mg by mouth daily 2 tabs (400 mg) PO once daily for 14 days then discontinue       [START ON 5/22/2024] amiodarone (PACERONE) 200 MG tablet Take 200 mg by mouth daily       aspirin 81 MG EC tablet Take 162 mg by mouth daily       atorvastatin (LIPITOR) 80 MG tablet Take 80 mg by mouth daily       bisoprolol (ZEBETA) 5 MG tablet Take 5 mg by mouth daily       buPROPion (WELLBUTRIN) 75 MG tablet Take 120 mg by mouth 2 times daily       clonazePAM (KLONOPIN) 0.5 MG tablet Take 0.5 mg by mouth daily as needed for anxiety       cyclobenzaprine (FLEXERIL) 5 MG tablet Take 5 mg by mouth 3 times daily For 14 days then discontinue       diclofenac (VOLTAREN) 1 % topical gel Apply 2 g topically 4 times daily       diphenhydrAMINE (BENADRYL) 25 MG capsule Take 25 mg by mouth nightly as needed for itching or allergies       DULoxetine (CYMBALTA) 30 MG  "capsule Take 30 mg by mouth daily       fexofenadine (ALLEGRA) 60 MG tablet Take 60 mg by mouth 2 times daily as needed for allergies       furosemide (LASIX) 40 MG tablet Take 20 mg by mouth daily       gabapentin (NEURONTIN) 600 MG tablet Take 300-900 mg by mouth 3 times daily 300 mg PO every morning and afternoon; 900 mg every evening at bedtime       lisinopril (ZESTRIL) 5 MG tablet Take 5 mg by mouth daily       magnesium oxide (MAG-OX) 400 MG tablet Take 400 mg by mouth daily       melatonin 3 MG tablet Take 3 mg by mouth nightly as needed for sleep       Multiple Vitamins-Minerals (MULTIVITAMIN ADULTS) TABS Take 1 tablet by mouth daily       nitroGLYcerin (NITROSTAT) 0.4 MG sublingual tablet Place 0.4 mg under the tongue every 5 minutes as needed for chest pain For chest pain place 1 tablet under the tongue every 5 minutes for 3 doses. If symptoms persist 5 minutes after 1st dose call 911.       spironolactone (ALDACTONE) 25 MG tablet Take 25 mg by mouth daily       traMADol HCl 25 MG TABS tablet Take 1 tablet (25 mg) by mouth every 6 hours as needed (pain) 30 tablet 0     traZODone (DESYREL) 50 MG tablet Take 50 mg by mouth nightly as needed for sleep       vitamin B-12 (CYANOCOBALAMIN) 1000 MCG tablet Take 1,000 mcg by mouth daily       vitamin D3 (CHOLECALCIFEROL) 1.25 MG (27354 UT) capsule Take 50,000 Units by mouth daily       No current facility-administered medications for this visit.       REVIEW OF SYSTEMS:   10 point review of systems reviewed and pertinent positives in the HPI.     PHYSICAL EXAMINATION:  Physical Exam     Vital signs: /59   Pulse 60   Temp 97.4  F (36.3  C)   Resp 18   Ht 1.715 m (5' 7.5\")   Wt 114 kg (251 lb 6.4 oz)   SpO2 96%   BMI 38.79 kg/m    General: Awake, Alert, oriented x1, lying in bed conversant  HEENT:Pink conjunctiva, moist oral mucosa  NECK: Supple  CVS:  S1  S2, without murmur or gallop.   LUNG: Clear to auscultation, No wheezes, rales or rhonci.   BACK: " No kyphosis of the thoracic spine  ABDOMEN: Soft, obese, non tender to palpation, with positive bowel sounds  EXTREMITIES: Moves both upper and lower extremities with generalized weakness, trace pedal edema, no calf tenderness  SKIN: Warm and dry  NEUROLOGIC: Intact, pulses palpable  PSYCHIATRIC: Pleasantly confused.  Patient thinks it is year 2014.      Labs:  All labs reviewed in the nursing home record and Epic     This note has been dictated using voice recognition software. Any grammatical or context distortions are unintentional and inherent to the software    Electronically signed by: Nina Birch CNP       Sincerely,        Nina Birch NP

## 2024-05-21 NOTE — PROGRESS NOTES
ANTICOAGULATION MANAGEMENT     Vikki Evans 63 year old female is on warfarin with therapeutic INR result. (Goal INR 2.0-3.0)    Recent labs: (last 7 days)     05/21/24  0000   INR 2.8       ASSESSMENT     Source(s): Chart Review and Home Care/Facility Nurse     Warfarin doses taken: Warfarin taken as instructed  Diet: No new diet changes identified  Medication/supplement changes:  Was on amiodarone 400 mg for 14 days, starting tomorrow amiodarone will be 200 mg daily.    New illness, injury, or hospitalization: No  Signs or symptoms of bleeding or clotting: No  Previous result: Therapeutic last 2(+) visits  Additional findings: None       PLAN     Recommended plan for ongoing change(s) affecting INR     Dosing Instructions: Continue your current warfarin dose with next INR in 3 days       Summary  As of 5/21/2024      Full warfarin instructions:  2 mg every Mon, Wed, Sat; 1 mg all other days   Next INR check:  5/24/2024               Telephone call with [facility] nurse (medical care for seniors) who verbalizes understanding and agrees to plan    Orders given to  Homecare nurse/facility to recheck    Education provided:   Please call back if any changes to your diet, medications or how you've been taking warfarin  Interaction IS anticipated between warfarin and amiodarone    Plan made per [facility] anticoagulation protocol    Vernell Skinner RN  Anticoagulation Clinic  5/21/2024    _______________________________________________________________________     Anticoagulation Episode Summary       Current INR goal:  2.0-3.0   TTR:  100.0% (4 d)   Target end date:  Indefinite   Send INR reminders to:  TAMMI MARQUES    Indications    Atrial fibrillation with RVR (H) [I48.91]             Comments:  A-Fib             Anticoagulation Care Providers       Provider Role Specialty Phone number    Nina Birch NP Referring Nurse Practitioner [phone]

## 2024-05-21 NOTE — PROGRESS NOTES
[facility]    Code Status:  FULL CODE   Visit Type:   Chief Complaint   Patient presents with    TCU Follow Up     Facility:  [facility] ([facility]) [[id number]]         HPI: Vikki Evans is a 63 year old female who I am seeing today for follow up on the TCU. Past medical history includes   VT s/p ablation (2016), NSVT, paroxysmal atrial fibrillation/flutter, chronic HFrEF, CAD status post stents, MVR, TVR, hypertension, hyperlipidemia, NARESH, DM2, and depression. Pt presented with fatigue and nausea. She had recently been at the ER and had leukocytosis with questionable pulmonary infiltrate. She was sent home on Augmentin and Z pack for possible pneumonia. Progressive dyspnea with inappropriate ICD shock 2/t A fib with RVR. Pt found to have acute on chronic CHF with cardiogenic shock. Pt initially required O2 up to 10L. Device interrogated with mx inappropriate shocks of A fib. She was treated with IV amiodarone and Digoxin and transitioned to oral amiodarone.  DEANA done which did not demonstrate LV aneurysm. AC changed to Coumadin. She was treated with IV diuretics and transitioned to orals.     Cardiac CT showed:     No intracardiac mass or thrombus is detected.  No left atrial appendage thrombus.  Possible pseudoaneurysm versus contained rupture (1 x 0.8 cm) noted at the anterior/apical left ventricular wall.  No pericardial effusion.  Prominent apical calcification.  Biventricular ICD is present.  Left ventricular enlargement is present.     Transitional Care Course: Today pt sitting up in bed.  Patient had a fall earlier this a.m. attempting to self shower.  She slipped in the bathroom.  She denies hitting her head.  She is reporting right hip pain.  Pain with movement.  She does have a history of chronic low back pain with radiculopathy.  She continues on tramadol, gabapentin and Flexeril.  Patient thinks the year is 2014.  Poor memory recall.  Nursing staff reported hallucinations earlier  "in the a.m.  She is afebrile.  No shortness of breath chest pain or palpitations.  Vital signs are stable.  Other than cognitive impairment neuro is intact.  Family reported that \"patient often gets confused when her thyroid is off\".       Assessment/Plan:     Fall  Right hip pain   Hallucinations  -Xray 2 view of right hip.   -Continue tylenol and tramadol for pain.   -Obtain UA/UC.   -Check CBC, BMP, TSH, T3 and T4.     Atrial fibrillation with RVR (H)  S/P ICD (internal cardiac defibrillator) procedure  -Amiodarone tapering.   -Denies CP or palpitations.   -INR 2.8.   -Coumadin Clinic to dose INR.     Acute on chronic diastolic heart failure (H)  -Decrease Lasix 20 mg every day. (Low bp).   -Lisinopril 5 mg every day.   -Spironolactone 12.5 mg every day.   -every day weights.   -Follow up BMP with Creatine 1.37 per baseline.     Low back pain with sciatica, sciatica laterality unspecified, unspecified back pain laterality, unspecified chronicity  Sciatica, right side  -Continue PTA gabapentin.   -Tramadol prn.   -Flexeril 5 mg TID.   -Pt followed out pt by Pain Team.     Hypertension, unspecified type  -Continue lisinopril.   -Monitor bp.     Diabetes mellitus due to underlying condition with hyperosmolarity without coma, without long-term current use of insulin (H)  -diet controlled.     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    Cervical high risk HPV (human papillomavirus) test positive 05/11/2023    Bilateral lower extremity edema 05/11/2023    Anemia, unspecified 04/30/2013    Allergic rhinitis, mild 05/07/2024    Hyperparathyroidism (H24) 10/07/2012    Hyperlipidemia LDL goal <70 11/19/2011    " History of sepsis 03/18/2024    History of nephrolithiasis 01/13/2013    Hand pain 10/25/2014    DEISI (generalized anxiety disorder) 02/23/2016    Elevated troponin 12/03/2022    Dysthymia 09/23/2015    Degenerative disc disease, lumbar 01/05/2024    Decreased functional mobility and endurance 01/05/2024    Cough 05/17/2018    Hypertension 05/07/2024    Osteoarthritis of both hips 10/31/2023    Open wound of skin 04/09/2023    Obstructive sleep apnea 05/07/2024    Nevus of left foot 12/31/2015    Neck pain, chronic 04/18/2012    Chronic pain disorder 05/17/2018    Major depressive disorder, recurrent severe without psychotic features (H) 01/10/2023    Leukocytosis 12/03/2022    Lactic acidosis 04/29/2024    ICD (implantable cardioverter-defibrillator) in place 12/03/2022    Pre-syncope 12/03/2022    Paroxysmal atrial fibrillation (H) 10/31/2023    Atrial fibrillation with RVR (H) 04/29/2024    Pain in joint 10/25/2014    Osteopenia 07/16/2012    Osteoarthritis of both knees 06/18/2018    Type 2 diabetes mellitus with complication, without long-term current use of insulin (H) 09/16/2006    Tension type headache 03/04/2014    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    Sciatica, right side 05/11/2023    S/P gastric bypass 09/28/2012    Restless legs 04/19/2016    Vitamin D deficiency 09/19/2007    Ventricular tachycardia (H) 11/01/2022    Venous stasis dermatitis of both lower extremities 05/11/2023    COVID-19 12/01/2021    Low back pain 04/18/2012    MDD (major depressive disorder), recurrent episode, moderate (H) 02/23/2016    Primary osteoarthritis of both knees 06/18/2018     Resolved Ambulatory Problems     Diagnosis Date Noted    No Resolved Ambulatory Problems     No Additional Past Medical History     Allergies   Allergen Reactions    Aspirin Other (See Comments)     History of gastric bypass surgery    Cats Other (See Comments)     Runny nose    Cephalexin Other (See Comments)     Insomnia    Dust  Mites Other (See Comments)     Runny nose    Ibuprofen Other (See Comments)     History of gastric bypass surgery    Nsaids Other (See Comments)     History of gastric bypass surgery       All Meds and Allergies reviewed in the record at the facility and is the most up-to-date.    Post Discharge Medication Reconciliation Status: discharge medications reconciled, continue medications without change  Current Outpatient Medications   Medication Sig Dispense Refill    acetaminophen (ACETAMINOPHEN 8 HOUR) 650 MG CR tablet Take 650 mg by mouth every 8 hours as needed for mild pain or fever      albuterol (PROAIR HFA/PROVENTIL HFA/VENTOLIN HFA) 108 (90 Base) MCG/ACT inhaler Inhale 2 puffs into the lungs 4 times daily as needed for shortness of breath, wheezing or cough      amiodarone (PACERONE) 200 MG tablet Take 400 mg by mouth 2 times daily 2 tabs (400 mg) PO BID for 2 days then discontinue      amiodarone (PACERONE) 200 MG tablet Take 400 mg by mouth daily 2 tabs (400 mg) PO once daily for 14 days then discontinue      [START ON 5/22/2024] amiodarone (PACERONE) 200 MG tablet Take 200 mg by mouth daily      aspirin 81 MG EC tablet Take 162 mg by mouth daily      atorvastatin (LIPITOR) 80 MG tablet Take 80 mg by mouth daily      bisoprolol (ZEBETA) 5 MG tablet Take 5 mg by mouth daily      buPROPion (WELLBUTRIN) 75 MG tablet Take 120 mg by mouth 2 times daily      clonazePAM (KLONOPIN) 0.5 MG tablet Take 0.5 mg by mouth daily as needed for anxiety      cyclobenzaprine (FLEXERIL) 5 MG tablet Take 5 mg by mouth 3 times daily For 14 days then discontinue      diclofenac (VOLTAREN) 1 % topical gel Apply 2 g topically 4 times daily      diphenhydrAMINE (BENADRYL) 25 MG capsule Take 25 mg by mouth nightly as needed for itching or allergies      DULoxetine (CYMBALTA) 30 MG capsule Take 30 mg by mouth daily      fexofenadine (ALLEGRA) 60 MG tablet Take 60 mg by mouth 2 times daily as needed for allergies      furosemide (LASIX)  "40 MG tablet Take 20 mg by mouth daily      gabapentin (NEURONTIN) 600 MG tablet Take 300-900 mg by mouth 3 times daily 300 mg PO every morning and afternoon; 900 mg every evening at bedtime      lisinopril (ZESTRIL) 5 MG tablet Take 5 mg by mouth daily      magnesium oxide (MAG-OX) 400 MG tablet Take 400 mg by mouth daily      melatonin 3 MG tablet Take 3 mg by mouth nightly as needed for sleep      Multiple Vitamins-Minerals (MULTIVITAMIN ADULTS) TABS Take 1 tablet by mouth daily      nitroGLYcerin (NITROSTAT) 0.4 MG sublingual tablet Place 0.4 mg under the tongue every 5 minutes as needed for chest pain For chest pain place 1 tablet under the tongue every 5 minutes for 3 doses. If symptoms persist 5 minutes after 1st dose call 911.      spironolactone (ALDACTONE) 25 MG tablet Take 25 mg by mouth daily      traMADol HCl 25 MG TABS tablet Take 1 tablet (25 mg) by mouth every 6 hours as needed (pain) 30 tablet 0    traZODone (DESYREL) 50 MG tablet Take 50 mg by mouth nightly as needed for sleep      vitamin B-12 (CYANOCOBALAMIN) 1000 MCG tablet Take 1,000 mcg by mouth daily      vitamin D3 (CHOLECALCIFEROL) 1.25 MG (35312 UT) capsule Take 50,000 Units by mouth daily       No current facility-administered medications for this visit.       REVIEW OF SYSTEMS:   10 point review of systems reviewed and pertinent positives in the HPI.     PHYSICAL EXAMINATION:  Physical Exam     Vital signs: /59   Pulse 60   Temp 97.4  F (36.3  C)   Resp 18   Ht 1.715 m (5' 7.5\")   Wt 114 kg (251 lb 6.4 oz)   SpO2 96%   BMI 38.79 kg/m    General: Awake, Alert, oriented x1, lying in bed conversant  HEENT:Pink conjunctiva, moist oral mucosa  NECK: Supple  CVS:  S1  S2, without murmur or gallop.   LUNG: Clear to auscultation, No wheezes, rales or rhonci.   BACK: No kyphosis of the thoracic spine  ABDOMEN: Soft, obese, non tender to palpation, with positive bowel sounds  EXTREMITIES: Moves both upper and lower extremities with " generalized weakness, trace pedal edema, no calf tenderness  SKIN: Warm and dry  NEUROLOGIC: Intact, pulses palpable  PSYCHIATRIC: Pleasantly confused.  Patient thinks it is year 2014.      Labs:  All labs reviewed in the nursing home record and Epic     This note has been dictated using voice recognition software. Any grammatical or context distortions are unintentional and inherent to the software    Electronically signed by: Nina Birch CNP

## 2024-05-23 NOTE — LETTER
5/23/2024        RE: Vikki Evans  [address] [place] [address]         [facility]    Code Status:  FULL CODE   Visit Type:   Chief Complaint   Patient presents with     TCU Follow Up     Facility:  [facility] ([facility]) [[id number]]         HPI: Vikki Evans is a 63 year old female who I am seeing today for follow up on the TCU. Past medical history includes   VT s/p ablation (2016), NSVT, paroxysmal atrial fibrillation/flutter, chronic HFrEF, CAD status post stents, MVR, TVR, hypertension, hyperlipidemia, NARESH, DM2, and depression. Pt presented with fatigue and nausea. She had recently been at the ER and had leukocytosis with questionable pulmonary infiltrate. She was sent home on Augmentin and Z pack for possible pneumonia. Progressive dyspnea with inappropriate ICD shock 2/t A fib with RVR. Pt found to have acute on chronic CHF with cardiogenic shock. Pt initially required O2 up to 10L. Device interrogated with mx inappropriate shocks of A fib. She was treated with IV amiodarone and Digoxin and transitioned to oral amiodarone.  DEANA done which did not demonstrate LV aneurysm. AC changed to Coumadin. She was treated with IV diuretics and transitioned to orals.     Cardiac CT showed:     No intracardiac mass or thrombus is detected.  No left atrial appendage thrombus.  Possible pseudoaneurysm versus contained rupture (1 x 0.8 cm) noted at the anterior/apical left ventricular wall.  No pericardial effusion.  Prominent apical calcification.  Biventricular ICD is present.  Left ventricular enlargement is present.     Transitional Care Course: Today pt sitting up in wheelchair. Recent fall. Slipped in bathroom. Xray of right hip showed DJD changes. No acute changes.  UTI.  Urine culture grew > 100,000 Citrobacter Koseri.  Patient initially started on Macrobid however sensitivities were intermediate.  Patient changed to Bactrim DS.  She appears to be tolerating well.  Patient afebrile.   No leukocytosis.  Patient's blood pressure has been on the soft side.  Today 89/47.  Her Lasix and spironolactone were held.  Fluids were started.  However cardiology was phoned per the nursing staff and they advised not holding her meds and stopping the fluids.  Patient has experienced dizziness and recent falls will hold meds.  Fluids.  Patient plans undergo AV node ablation on 5/30/2024.  She is currently on amiodarone which dose was recently decreased from 400 mg to 200 mg.  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  [facility] pharmacist consulted.  Patient started on levothyroxine 25 mcg 3 times weekly.  Will need ongoing monitoring of her thyroid function and when medication is discontinued will attempt to remove.  Chronic back pain with radiculopathy.  Pain control with tramadol, gabapentin and Flexeril.  Patient with previous confusion.  Today improved.  Answering questions appropriately.  Patient appears very dry.  Creatinine up to 1.58 from 1.3.  Most likely due to poor oral intake.  Again medications held.    Assessment/Plan:     Fall  Right hip pain   Hallucinations  -Xray 2 view of right hip showed DJD changes.  No acute fracture.  -Continue tylenol and tramadol for pain.     UTI  -Urine culture grew > 100,000 Citrobacter Koseri.    -Patient initially started on Macrobid however sensitivities were intermediate.  Patient changed to Bactrim DS. Continue for 7 days.   -No leukocytosis.   -Encourage fluids.     Atrial fibrillation with RVR (H)  S/P ICD (internal cardiac defibrillator) procedure  -Amiodarone tapering now on 200 mg daily until AV node ablation procedure on 5/30/2024.  -Denies CP or palpitations.   -INR 2.8.   -Coumadin Clinic to dose INR.     Acute on chronic diastolic heart failure (H)  -Decrease Lasix 20 mg every day. (Low bp).   -Lisinopril 5 mg every day.   -Spironolactone 12.5 mg every day.   -every day weights.   -Follow up BMP with Creatine now 1.58. BMP  pending today.     Dehydration  Hypotension  -most likely due to hypovolemia and meds.   -Creatine now 1.58 up from 1.3. Pt appears dry.   -Diuretics held today.   -Cardiology does not want pt to receive IV fluids. Fluids initially ordered but discontinued.   -Repeat BMP in am.     Hypothyroidism  -medication induced from Amiodarone.   -TSH 15.60, T3 61, T4 0.81.   -Start levothyroxine 25 mcg 3/weekly.   -Pt will need ongoing monitoring of TSH. Plans to get off supplement once off amiodarone.       Low back pain with sciatica, sciatica laterality unspecified, unspecified back pain laterality, unspecified chronicity  Sciatica, right side  -Continue PTA gabapentin.   -Tramadol prn.   -Flexeril 5 mg TID.   -Pt followed out pt by Pain Team. .     Diabetes mellitus due to underlying condition with hyperosmolarity without coma, without long-term current use of insulin (H)  -diet controlled.     Active Ambulatory Problems     Diagnosis Date Noted     Acute on chronic HFrEF (heart failure with reduced ejection fraction) (H) 04/29/2024     Chronic systolic heart failure (H) 08/06/2012     Coronary atherosclerosis 05/07/2024     Contusion of shoulder 10/07/2016     Continuous severe abdominal pain 12/31/2015     Circadian rhythm sleep disorder 07/06/2017     Cervical high risk HPV (human papillomavirus) test positive 05/11/2023     Bilateral lower extremity edema 05/11/2023     Anemia, unspecified 04/30/2013     Allergic rhinitis, mild 05/07/2024     Hyperparathyroidism (H24) 10/07/2012     Hyperlipidemia LDL goal <70 11/19/2011     History of sepsis 03/18/2024     History of nephrolithiasis 01/13/2013     Hand pain 10/25/2014     DEISI (generalized anxiety disorder) 02/23/2016     Elevated troponin 12/03/2022     Dysthymia 09/23/2015     Degenerative disc disease, lumbar 01/05/2024     Decreased functional mobility and endurance 01/05/2024     Cough 05/17/2018     Hypertension 05/07/2024     Osteoarthritis of both hips  10/31/2023     Open wound of skin 04/09/2023     Obstructive sleep apnea 05/07/2024     Nevus of left foot 12/31/2015     Neck pain, chronic 04/18/2012     Chronic pain disorder 05/17/2018     Major depressive disorder, recurrent severe without psychotic features (H) 01/10/2023     Leukocytosis 12/03/2022     Lactic acidosis 04/29/2024     ICD (implantable cardioverter-defibrillator) in place 12/03/2022     Pre-syncope 12/03/2022     Paroxysmal atrial fibrillation (H) 10/31/2023     Atrial fibrillation with RVR (H) 04/29/2024     Pain in joint 10/25/2014     Osteopenia 07/16/2012     Osteoarthritis of both knees 06/18/2018     Type 2 diabetes mellitus with complication, without long-term current use of insulin (H) 09/16/2006     Tension type headache 03/04/2014     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     Sciatica, right side 05/11/2023     S/P gastric bypass 09/28/2012     Restless legs 04/19/2016     Vitamin D deficiency 09/19/2007     Ventricular tachycardia (H) 11/01/2022     Venous stasis dermatitis of both lower extremities 05/11/2023     COVID-19 12/01/2021     Low back pain 04/18/2012     MDD (major depressive disorder), recurrent episode, moderate (H) 02/23/2016     Primary osteoarthritis of both knees 06/18/2018     Resolved Ambulatory Problems     Diagnosis Date Noted     No Resolved Ambulatory Problems     No Additional Past Medical History     Allergies   Allergen Reactions     Aspirin Other (See Comments)     History of gastric bypass surgery     Cats Other (See Comments)     Runny nose     Cephalexin Other (See Comments)     Insomnia     Dust Mites Other (See Comments)     Runny nose     Ibuprofen Other (See Comments)     History of gastric bypass surgery     Nsaids Other (See Comments)     History of gastric bypass surgery       All Meds and Allergies reviewed in the record at the facility and is the most up-to-date.    Current Outpatient Medications   Medication Sig Dispense Refill      acetaminophen (ACETAMINOPHEN 8 HOUR) 650 MG CR tablet Take 650 mg by mouth every 8 hours as needed for mild pain or fever       albuterol (PROAIR HFA/PROVENTIL HFA/VENTOLIN HFA) 108 (90 Base) MCG/ACT inhaler Inhale 2 puffs into the lungs 4 times daily as needed for shortness of breath, wheezing or cough       amiodarone (PACERONE) 200 MG tablet Take 400 mg by mouth 2 times daily 2 tabs (400 mg) PO BID for 2 days then discontinue       amiodarone (PACERONE) 200 MG tablet Take 400 mg by mouth daily 2 tabs (400 mg) PO once daily for 14 days then discontinue       amiodarone (PACERONE) 200 MG tablet Take 200 mg by mouth daily       aspirin 81 MG EC tablet Take 162 mg by mouth daily       atorvastatin (LIPITOR) 80 MG tablet Take 80 mg by mouth daily       bisoprolol (ZEBETA) 5 MG tablet Take 5 mg by mouth daily       buPROPion (WELLBUTRIN) 75 MG tablet Take 120 mg by mouth 2 times daily       clonazePAM (KLONOPIN) 0.5 MG tablet Take 0.5 mg by mouth daily as needed for anxiety       cyclobenzaprine (FLEXERIL) 5 MG tablet Take 5 mg by mouth 3 times daily For 14 days then discontinue       diclofenac (VOLTAREN) 1 % topical gel Apply 2 g topically 4 times daily       diphenhydrAMINE (BENADRYL) 25 MG capsule Take 25 mg by mouth nightly as needed for itching or allergies       DULoxetine (CYMBALTA) 30 MG capsule Take 30 mg by mouth daily       fexofenadine (ALLEGRA) 60 MG tablet Take 60 mg by mouth 2 times daily as needed for allergies       furosemide (LASIX) 40 MG tablet Take 20 mg by mouth daily       gabapentin (NEURONTIN) 600 MG tablet Take 300-900 mg by mouth 3 times daily 300 mg PO every morning and afternoon; 900 mg every evening at bedtime       lisinopril (ZESTRIL) 5 MG tablet Take 5 mg by mouth daily       magnesium oxide (MAG-OX) 400 MG tablet Take 400 mg by mouth daily       melatonin 3 MG tablet Take 3 mg by mouth nightly as needed for sleep       Multiple Vitamins-Minerals (MULTIVITAMIN ADULTS) TABS Take 1  "tablet by mouth daily       nitroGLYcerin (NITROSTAT) 0.4 MG sublingual tablet Place 0.4 mg under the tongue every 5 minutes as needed for chest pain For chest pain place 1 tablet under the tongue every 5 minutes for 3 doses. If symptoms persist 5 minutes after 1st dose call 911.       spironolactone (ALDACTONE) 25 MG tablet Take 25 mg by mouth daily       traMADol HCl 25 MG TABS tablet Take 1 tablet (25 mg) by mouth every 6 hours as needed (pain) 30 tablet 0     traZODone (DESYREL) 50 MG tablet Take 50 mg by mouth nightly as needed for sleep       vitamin B-12 (CYANOCOBALAMIN) 1000 MCG tablet Take 1,000 mcg by mouth daily       vitamin D3 (CHOLECALCIFEROL) 1.25 MG (03042 UT) capsule Take 50,000 Units by mouth daily       No current facility-administered medications for this visit.       REVIEW OF SYSTEMS:   10 point review of systems reviewed and pertinent positives in the HPI.     PHYSICAL EXAMINATION:  Physical Exam     Vital signs: BP 99/66   Pulse 60   Temp 98.3  F (36.8  C)   Resp 16   Ht 1.715 m (5' 7.5\")   Wt 112.9 kg (248 lb 12.8 oz)   SpO2 93%   BMI 38.39 kg/m    General: Awake, Alert, oriented x3,  conversant  HEENT:Pink conjunctiva, moist oral mucosa  NECK: Supple  CVS:  S1  S2, without murmur or gallop.   LUNG: Clear to auscultation, No wheezes, rales or rhonci.   BACK: No kyphosis of the thoracic spine  ABDOMEN: Soft, obese, non tender to palpation, with positive bowel sounds  EXTREMITIES: Moves both upper and lower extremities with generalized weakness, trace pedal edema, no calf tenderness  SKIN: Warm and dry  NEUROLOGIC: Intact, pulses palpable  PSYCHIATRIC: answering questions appropriately.       Labs:  All labs reviewed in the nursing home record and Epic     I did talk with her sister and reported labs, imagine results as well as plan of care.     This note has been dictated using voice recognition software. Any grammatical or context distortions are unintentional and inherent to the " software    Electronically signed by: Nina Birch CNP       Sincerely,        Nina Birch, NP

## 2024-05-24 NOTE — TELEPHONE ENCOUNTER
[facility] TELEPHONE ENCOUNTER    Chief Complaint   Patient presents with    Hypotension       Vikki Evans is a 63 year old  (1960),Nurse called today to report: Hypotension <100 most of the time. HR 60s. Registered Nurse reports did contact cardiology yesterday with call back today with notification to hold if SBP<100. Has not received any BP medication all day due to this hypotension concern.     ASSESSMENT/PLAN    Discontinue lisinopril   Change HOLD parameters for furosemide and bisoprolol to HOLD if SBP <110  BP and HR every 4H x 24 hours. Update provider if SBP>170.   Encourage oral fluid hydration.     Electronically signed by:   MING Russell CNP

## 2024-05-24 NOTE — PROGRESS NOTES
[facility] GERIATRIC SERVICES    Code Status:  FULL CODE   Visit Type:   Chief Complaint   Patient presents with    TCU Follow Up     Facility:  [facility] ([facility]) [[id number]]         HPI: Vikki Evans is a 63 year old female who I am seeing today for follow up on the TCU. Past medical history includes   VT s/p ablation (2016), NSVT, paroxysmal atrial fibrillation/flutter, chronic HFrEF, CAD status post stents, MVR, TVR, hypertension, hyperlipidemia, NARESH, DM2, and depression. Pt presented with fatigue and nausea. She had recently been at the ER and had leukocytosis with questionable pulmonary infiltrate. She was sent home on Augmentin and Z pack for possible pneumonia. Progressive dyspnea with inappropriate ICD shock 2/t A fib with RVR. Pt found to have acute on chronic CHF with cardiogenic shock. Pt initially required O2 up to 10L. Device interrogated with mx inappropriate shocks of A fib. She was treated with IV amiodarone and Digoxin and transitioned to oral amiodarone.  DEANA done which did not demonstrate LV aneurysm. AC changed to Coumadin. She was treated with IV diuretics and transitioned to orals.     Cardiac CT showed:     No intracardiac mass or thrombus is detected.  No left atrial appendage thrombus.  Possible pseudoaneurysm versus contained rupture (1 x 0.8 cm) noted at the anterior/apical left ventricular wall.  No pericardial effusion.  Prominent apical calcification.  Biventricular ICD is present.  Left ventricular enlargement is present.     Transitional Care Course: Today pt sitting up in wheelchair. Recent fall. Slipped in bathroom. Xray of right hip showed DJD changes. No acute changes.  UTI.  Urine culture grew > 100,000 Citrobacter Koseri.  Patient initially started on Macrobid however sensitivities were intermediate.  Patient changed to Bactrim DS.  She appears to be tolerating well.  Patient afebrile.  No leukocytosis.  Patient's blood pressure has been on the soft side.  Today 89/47.   Her Lasix and spironolactone were held.  Fluids were started.  However cardiology was phoned per the nursing staff and they advised not holding her meds and stopping the fluids.  Patient has experienced dizziness and recent falls will hold meds.  Fluids.  Patient plans undergo AV node ablation on 5/30/2024.  She is currently on amiodarone which dose was recently decreased from 400 mg to 200 mg.  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  [place] pharmacist consulted.  Patient started on levothyroxine 25 mcg 3 times weekly.  Will need ongoing monitoring of her thyroid function and when medication is discontinued will attempt to remove.  Chronic back pain with radiculopathy.  Pain control with tramadol, gabapentin and Flexeril.  Patient with previous confusion.  Today improved.  Answering questions appropriately.  Patient appears very dry.  Creatinine up to 1.58 from 1.3.  Most likely due to poor oral intake.  Again medications held.    Assessment/Plan:     Fall  Right hip pain   Hallucinations  -Xray 2 view of right hip showed DJD changes.  No acute fracture.  -Continue tylenol and tramadol for pain.     UTI  -Urine culture grew > 100,000 Citrobacter Koseri.    -Patient initially started on Macrobid however sensitivities were intermediate.  Patient changed to Bactrim DS. Continue for 7 days.   -No leukocytosis.   -Encourage fluids.     Atrial fibrillation with RVR (H)  S/P ICD (internal cardiac defibrillator) procedure  -Amiodarone tapering now on 200 mg daily until AV node ablation procedure on 5/30/2024.  -Denies CP or palpitations.   -INR 2.8.   -Coumadin Clinic to dose INR.     Acute on chronic diastolic heart failure (H)  -Decrease Lasix 20 mg every day. (Low bp).   -Lisinopril 5 mg every day.   -Spironolactone 12.5 mg every day.   -every day weights.   -Follow up BMP with Creatine now 1.58. BMP pending today.     Dehydration  Hypotension  -most likely due to hypovolemia and meds.    -Creatine now 1.58 up from 1.3. Pt appears dry.   -Diuretics held today.   -Cardiology does not want pt to receive IV fluids. Fluids initially ordered but discontinued.   -Repeat BMP in am.     Hypothyroidism  -medication induced from Amiodarone.   -TSH 15.60, T3 61, T4 0.81.   -Start levothyroxine 25 mcg 3/weekly.   -Pt will need ongoing monitoring of TSH. Plans to get off supplement once off amiodarone.       Low back pain with sciatica, sciatica laterality unspecified, unspecified back pain laterality, unspecified chronicity  Sciatica, right side  -Continue PTA gabapentin.   -Tramadol prn.   -Flexeril 5 mg TID.   -Pt followed out pt by Pain Team. .     Diabetes mellitus due to underlying condition with hyperosmolarity without coma, without long-term current use of insulin (H)  -diet controlled.     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    Cervical high risk HPV (human papillomavirus) test positive 05/11/2023    Bilateral lower extremity edema 05/11/2023    Anemia, unspecified 04/30/2013    Allergic rhinitis, mild 05/07/2024    Hyperparathyroidism (H24) 10/07/2012    Hyperlipidemia LDL goal <70 11/19/2011    History of sepsis 03/18/2024    History of nephrolithiasis 01/13/2013    Hand pain 10/25/2014    DEISI (generalized anxiety disorder) 02/23/2016    Elevated troponin 12/03/2022    Dysthymia 09/23/2015    Degenerative disc disease, lumbar 01/05/2024    Decreased functional mobility and endurance 01/05/2024    Cough 05/17/2018    Hypertension 05/07/2024    Osteoarthritis of both hips 10/31/2023    Open wound of skin 04/09/2023    Obstructive sleep apnea 05/07/2024    Nevus of left foot 12/31/2015     Neck pain, chronic 04/18/2012    Chronic pain disorder 05/17/2018    Major depressive disorder, recurrent severe without psychotic features (H) 01/10/2023    Leukocytosis 12/03/2022    Lactic acidosis 04/29/2024    ICD (implantable cardioverter-defibrillator) in place 12/03/2022    Pre-syncope 12/03/2022    Paroxysmal atrial fibrillation (H) 10/31/2023    Atrial fibrillation with RVR (H) 04/29/2024    Pain in joint 10/25/2014    Osteopenia 07/16/2012    Osteoarthritis of both knees 06/18/2018    Type 2 diabetes mellitus with complication, without long-term current use of insulin (H) 09/16/2006    Tension type headache 03/04/2014    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    Sciatica, right side 05/11/2023    S/P gastric bypass 09/28/2012    Restless legs 04/19/2016    Vitamin D deficiency 09/19/2007    Ventricular tachycardia (H) 11/01/2022    Venous stasis dermatitis of both lower extremities 05/11/2023    COVID-19 12/01/2021    Low back pain 04/18/2012    MDD (major depressive disorder), recurrent episode, moderate (H) 02/23/2016    Primary osteoarthritis of both knees 06/18/2018     Resolved Ambulatory Problems     Diagnosis Date Noted    No Resolved Ambulatory Problems     No Additional Past Medical History     Allergies   Allergen Reactions    Aspirin Other (See Comments)     History of gastric bypass surgery    Cats Other (See Comments)     Runny nose    Cephalexin Other (See Comments)     Insomnia    Dust Mites Other (See Comments)     Runny nose    Ibuprofen Other (See Comments)     History of gastric bypass surgery    Nsaids Other (See Comments)     History of gastric bypass surgery       All Meds and Allergies reviewed in the record at the facility and is the most up-to-date.    Current Outpatient Medications   Medication Sig Dispense Refill    acetaminophen (ACETAMINOPHEN 8 HOUR) 650 MG CR tablet Take 650 mg by mouth every 8 hours as needed for mild pain or fever      albuterol (PROAIR  HFA/PROVENTIL HFA/VENTOLIN HFA) 108 (90 Base) MCG/ACT inhaler Inhale 2 puffs into the lungs 4 times daily as needed for shortness of breath, wheezing or cough      amiodarone (PACERONE) 200 MG tablet Take 400 mg by mouth 2 times daily 2 tabs (400 mg) PO BID for 2 days then discontinue      amiodarone (PACERONE) 200 MG tablet Take 400 mg by mouth daily 2 tabs (400 mg) PO once daily for 14 days then discontinue      amiodarone (PACERONE) 200 MG tablet Take 200 mg by mouth daily      aspirin 81 MG EC tablet Take 162 mg by mouth daily      atorvastatin (LIPITOR) 80 MG tablet Take 80 mg by mouth daily      bisoprolol (ZEBETA) 5 MG tablet Take 5 mg by mouth daily      buPROPion (WELLBUTRIN) 75 MG tablet Take 120 mg by mouth 2 times daily      clonazePAM (KLONOPIN) 0.5 MG tablet Take 0.5 mg by mouth daily as needed for anxiety      cyclobenzaprine (FLEXERIL) 5 MG tablet Take 5 mg by mouth 3 times daily For 14 days then discontinue      diclofenac (VOLTAREN) 1 % topical gel Apply 2 g topically 4 times daily      diphenhydrAMINE (BENADRYL) 25 MG capsule Take 25 mg by mouth nightly as needed for itching or allergies      DULoxetine (CYMBALTA) 30 MG capsule Take 30 mg by mouth daily      fexofenadine (ALLEGRA) 60 MG tablet Take 60 mg by mouth 2 times daily as needed for allergies      furosemide (LASIX) 40 MG tablet Take 20 mg by mouth daily      gabapentin (NEURONTIN) 600 MG tablet Take 300-900 mg by mouth 3 times daily 300 mg PO every morning and afternoon; 900 mg every evening at bedtime      lisinopril (ZESTRIL) 5 MG tablet Take 5 mg by mouth daily      magnesium oxide (MAG-OX) 400 MG tablet Take 400 mg by mouth daily      melatonin 3 MG tablet Take 3 mg by mouth nightly as needed for sleep      Multiple Vitamins-Minerals (MULTIVITAMIN ADULTS) TABS Take 1 tablet by mouth daily      nitroGLYcerin (NITROSTAT) 0.4 MG sublingual tablet Place 0.4 mg under the tongue every 5 minutes as needed for chest pain For chest pain place  "1 tablet under the tongue every 5 minutes for 3 doses. If symptoms persist 5 minutes after 1st dose call 911.      spironolactone (ALDACTONE) 25 MG tablet Take 25 mg by mouth daily      traMADol HCl 25 MG TABS tablet Take 1 tablet (25 mg) by mouth every 6 hours as needed (pain) 30 tablet 0    traZODone (DESYREL) 50 MG tablet Take 50 mg by mouth nightly as needed for sleep      vitamin B-12 (CYANOCOBALAMIN) 1000 MCG tablet Take 1,000 mcg by mouth daily      vitamin D3 (CHOLECALCIFEROL) 1.25 MG (15412 UT) capsule Take 50,000 Units by mouth daily       No current facility-administered medications for this visit.       REVIEW OF SYSTEMS:   10 point review of systems reviewed and pertinent positives in the HPI.     PHYSICAL EXAMINATION:  Physical Exam     Vital signs: BP 99/66   Pulse 60   Temp 98.3  F (36.8  C)   Resp 16   Ht 1.715 m (5' 7.5\")   Wt 112.9 kg (248 lb 12.8 oz)   SpO2 93%   BMI 38.39 kg/m    General: Awake, Alert, oriented x3,  conversant  HEENT:Pink conjunctiva, moist oral mucosa  NECK: Supple  CVS:  S1  S2, without murmur or gallop.   LUNG: Clear to auscultation, No wheezes, rales or rhonci.   BACK: No kyphosis of the thoracic spine  ABDOMEN: Soft, obese, non tender to palpation, with positive bowel sounds  EXTREMITIES: Moves both upper and lower extremities with generalized weakness, trace pedal edema, no calf tenderness  SKIN: Warm and dry  NEUROLOGIC: Intact, pulses palpable  PSYCHIATRIC: answering questions appropriately.       Labs:  All labs reviewed in the nursing home record and Epic     I did talk with her sister and reported labs, imagine results as well as plan of care.     This note has been dictated using voice recognition software. Any grammatical or context distortions are unintentional and inherent to the software    Electronically signed by: Nina Birch CNP   "

## 2024-05-24 NOTE — TELEPHONE ENCOUNTER
"Nurse called back from [facility] to clarify when next INR was due.  Per their records it was scheduled for 5/26/24 however nurse said, \"that doesn't make sense because it is a Sunday and you guys aren't open on Sunday and you're closed Monday for the holiday\".      Reviewed last [facility] note and the recheck date in [facility] note is 5/24/24 and their order of 5/26/24 was entered incorrectly.  Advised nurse to check INR with POC machine and call results back to [facility].    Nurse stated understanding and was agreeable with plan.    JORDAN GriffithsN, RN  Anticoagulation Clinic    "

## 2024-05-24 NOTE — PROGRESS NOTES
[facility] GERIATRICS  ACUTE/EPISODIC VISIT    [facility] Medical Record Number:  [id number]  Place of Service where encounter took place:  [facility] ([facility]) [[id number]]    Chief Complaint   Patient presents with    RECHECK       HPI:    Vikki Evans is a 63 year old  (1960), who is being seen today for an episodic care visit.  HPI information obtained from: facility chart records, facility staff, patient report, [facility] Epic chart review, and Care Everywhere Epic chart review.    Today's concern is:    Diagnoses         Codes Comments    Hypertension, unspecified type    -  Primary I10     Hypotension due to hypovolemia     E86.1     Atrial fibrillation with RVR (H)     I48.91     Acute on chronic diastolic heart failure (H)     I50.33           Was asked to see Vikki per regular rounding NP provider for the TCU unit.  Had a BMP done today and awaiting results.  Most likely will not be back to later tonight.    It is felt she is very dehydrated.  Cardiology is involved in her care and so trying to work with the clinic to keep them in the loop with questions of medications and parameters.  Today nursing asking about parameters to hold B/P medications.  This NP could easily give parameters but Cardiology has their goals as well.  Asked nursing to put a call out the clinic to ask for parameters.  Ok to hold medication now for low B/P until hear back from clinic.  Ongoing updates from nursing all day today.    Finding Vikki in her room.  Have seen her participating with therapies as passing in hallway.   No real complaints.  Informed her trying to be in contact with the cardiology office as they have wanted things different than what rounding NP has attempted.  No acute distress at time of visit.    ALLERGIES:    Allergies   Allergen Reactions    Aspirin Other (See Comments)     History of gastric bypass surgery    Cats Other (See Comments)     Runny nose    Cephalexin Other (See Comments)      Insomnia    Dust Mites Other (See Comments)     Runny nose    Ibuprofen Other (See Comments)     History of gastric bypass surgery    Nsaids Other (See Comments)     History of gastric bypass surgery        MEDICATIONS:  Post Discharge Medication Reconciliation Status: medication reconcilation previously completed during another office visit.     Current Outpatient Medications   Medication Sig Dispense Refill    acetaminophen (ACETAMINOPHEN 8 HOUR) 650 MG CR tablet Take 650 mg by mouth every 8 hours as needed for mild pain or fever      albuterol (PROAIR HFA/PROVENTIL HFA/VENTOLIN HFA) 108 (90 Base) MCG/ACT inhaler Inhale 2 puffs into the lungs 4 times daily as needed for shortness of breath, wheezing or cough      amiodarone (PACERONE) 200 MG tablet Take 400 mg by mouth 2 times daily 2 tabs (400 mg) PO BID for 2 days then discontinue      amiodarone (PACERONE) 200 MG tablet Take 400 mg by mouth daily 2 tabs (400 mg) PO once daily for 14 days then discontinue      amiodarone (PACERONE) 200 MG tablet Take 200 mg by mouth daily      aspirin 81 MG EC tablet Take 162 mg by mouth daily      atorvastatin (LIPITOR) 80 MG tablet Take 80 mg by mouth daily      bisoprolol (ZEBETA) 5 MG tablet Take 5 mg by mouth daily      buPROPion (WELLBUTRIN) 75 MG tablet Take 120 mg by mouth 2 times daily      clonazePAM (KLONOPIN) 0.5 MG tablet Take 0.5 mg by mouth daily as needed for anxiety      cyclobenzaprine (FLEXERIL) 5 MG tablet Take 5 mg by mouth 3 times daily For 14 days then discontinue      diclofenac (VOLTAREN) 1 % topical gel Apply 2 g topically 4 times daily      diphenhydrAMINE (BENADRYL) 25 MG capsule Take 25 mg by mouth nightly as needed for itching or allergies      DULoxetine (CYMBALTA) 30 MG capsule Take 30 mg by mouth daily      fexofenadine (ALLEGRA) 60 MG tablet Take 60 mg by mouth 2 times daily as needed for allergies      furosemide (LASIX) 40 MG tablet Take 20 mg by mouth daily      gabapentin (NEURONTIN) 600  MG tablet Take 300-900 mg by mouth 3 times daily 300 mg PO every morning and afternoon; 900 mg every evening at bedtime      levothyroxine (SYNTHROID/LEVOTHROID) 25 MCG tablet Take 25 mcg by mouth three times a week      lisinopril (ZESTRIL) 5 MG tablet Take 5 mg by mouth daily      magnesium oxide (MAG-OX) 400 MG tablet Take 400 mg by mouth daily      melatonin 3 MG tablet Take 3 mg by mouth nightly as needed for sleep      Multiple Vitamins-Minerals (MULTIVITAMIN ADULTS) TABS Take 1 tablet by mouth daily      nitroGLYcerin (NITROSTAT) 0.4 MG sublingual tablet Place 0.4 mg under the tongue every 5 minutes as needed for chest pain For chest pain place 1 tablet under the tongue every 5 minutes for 3 doses. If symptoms persist 5 minutes after 1st dose call 911.      spironolactone (ALDACTONE) 25 MG tablet Take 25 mg by mouth daily      sulfamethoxazole-trimethoprim (BACTRIM DS) 800-160 MG tablet Take 1 tablet by mouth 2 times daily      traMADol HCl 25 MG TABS tablet Take 1 tablet (25 mg) by mouth every 6 hours as needed (pain) 30 tablet 0    traZODone (DESYREL) 50 MG tablet Take 50 mg by mouth nightly as needed for sleep      vitamin B-12 (CYANOCOBALAMIN) 1000 MCG tablet Take 1,000 mcg by mouth daily      vitamin D3 (CHOLECALCIFEROL) 1.25 MG (88210 UT) capsule Take 50,000 Units by mouth daily         REVIEW OF SYSTEMS:  4 point ROS neg other than the symptoms noted above in the HPI.        PHYSICAL EXAM:  BP 96/68   Pulse 68   Temp 98.3  F (36.8  C)   Resp 16   Wt 112.9 kg (248 lb 12.8 oz)   SpO2 96%   BMI 38.39 kg/m    Vikki was sitting in a chair in her room.  Alert and oriented x2  A1 with mobility.  Using the 4WW with therapy otherwise wheelchair used for on the unit.    Heart rate regular/irregular and distant tones.  Lungs are clear.    Calves are firm to touch.    Abdomen is large, round, soft to touch.  Decreased bowel sounds.    Continent and incontinent of bladder.      Component      Latest Ref Rng  5/20/2024  12:35 PM 5/23/2024  7:01 AM   Sodium      135 - 145 mmol/L 138  133 (L)    Potassium      3.4 - 5.3 mmol/L 5.2  5.2    Chloride      98 - 107 mmol/L 99  98    Carbon Dioxide (CO2)      22 - 29 mmol/L 25  24    Anion Gap      7 - 15 mmol/L 14  11    Urea Nitrogen      8.0 - 23.0 mg/dL 31.0 (H)  30.8 (H)    Creatinine      0.51 - 0.95 mg/dL 1.53 (H)  1.44 (H)    GFR Estimate      >60 mL/min/1.73m2 38 (L)  41 (L)    Calcium      8.8 - 10.2 mg/dL 9.6  9.2    Glucose      70 - 99 mg/dL 109 (H)  86          ASSESSMENT / PLAN:  (I10) Hypertension, unspecified type  (primary encounter diagnosis)  (E86.1) Hypotension due to hypovolemia  Comment: waiting on BMP from today but reviewed last two BMPs and see the Cr has improved but still elevated.  No IV at this time.  Did attempt but stopped.  Cardiology did not think it was necessary?    Currently has Lasix 20mg daily, bisoprolol 5mg daily and lisinopril 5mg daily.  Today her morning B/P was systolically in the 90's.  Would hold the medications and speak with cardiology.  Ok if she does not get them today.  Other suggestion is to spread them out and not give all in AM since the beta blocker and lisinopril are daily.      (I48.91) Atrial fibrillation with RVR (H)  Comment: INR done and waiting on orders from the coumadin clinic.  Does take amiodarone 200mg in AM.  Pulses are in the 50-60's at times of checking vitals.    Did hear that she may undergo a AV node ablation on 5/30/24.    (I50.33) Acute on chronic diastolic heart failure (H)  Comment: no acute symptoms at this time.  Lasix 20mg po daily.  Of the medications affecting her B/P, feel this is less likely going to be the one to lower her.  Encourage her to elevate legs, keep up with therapies to ambulate as this will help with edema..       Orders:  \orders from cardiology clinic stated: ok to hold B/P medications:  lisinopril, lasix and Bisoprolol if SBP <100     Electronically signed by  Lacey Shahid  MING Hernandez CNP

## 2024-05-24 NOTE — TELEPHONE ENCOUNTER
RAFAEL from [facility] received that patient is down for an INR recheck on 5/26.    Called back and spoke with another nurse to advise INR is actually due 05/24/24     Noted patient is on amlodipine and increasing dose, also have ablation on 5/30    TCU nurse needed to hang up phone and call back to get orders.     When TCU nurse calls back:    - INR needed today     Bianca Kaur, RN, BSN, PHN  Anticoagulation Nurse  [phone]

## 2024-05-24 NOTE — LETTER
5/24/2024        RE: Vikki Evans  [address] [address]        No notes on file      Sincerely,        MING Bell CNP

## 2024-05-24 NOTE — LETTER
5/24/2024      Vikki Evans  [address] [address]        [facility] GERIATRICS  ACUTE/EPISODIC VISIT    [facility] Medical Record Number:  [id number]  Place of Service where encounter took place:  [facility] ([facility]) [[id number]]    Chief Complaint   Patient presents with     RECHECK       HPI:    Vikki Evans is a 63 year old  (1960), who is being seen today for an episodic care visit.  HPI information obtained from: facility chart records, facility staff, patient report, [facility] chart review, and Care Everywhere [facility] chart review.    Today's concern is:    Diagnoses         Codes Comments    Hypertension, unspecified type    -  Primary I10     Hypotension due to hypovolemia     E86.1     Atrial fibrillation with RVR (H)     I48.91     Acute on chronic diastolic heart failure (H)     I50.33           Was asked to see Vikki per regular rounding NP provider for the TCU unit.  Had a BMP done today and awaiting results.  Most likely will not be back to later tonight.    It is felt she is very dehydrated.  Cardiology is involved in her care and so trying to work with the clinic to keep them in the loop with questions of medications and parameters.  Today nursing asking about parameters to hold B/P medications.  This NP could easily give parameters but Cardiology has their goals as well.  Asked nursing to put a call out the clinic to ask for parameters.  Ok to hold medication now for low B/P until hear back from clinic.  Ongoing updates from nursing all day today.    Finding Vikki in her room.  Have seen her participating with therapies as passing in hallway.   No real complaints.  Informed her trying to be in contact with the cardiology office as they have wanted things different than what rounding NP has attempted.  No acute distress at time of visit.    ALLERGIES:    Allergies   Allergen Reactions     Aspirin Other (See Comments)     History of gastric bypass surgery      Cats Other (See Comments)     Runny nose     Cephalexin Other (See Comments)     Insomnia     Dust Mites Other (See Comments)     Runny nose     Ibuprofen Other (See Comments)     History of gastric bypass surgery     Nsaids Other (See Comments)     History of gastric bypass surgery        MEDICATIONS:  Post Discharge Medication Reconciliation Status: medication reconcilation previously completed during another office visit.     Current Outpatient Medications   Medication Sig Dispense Refill     acetaminophen (ACETAMINOPHEN 8 HOUR) 650 MG CR tablet Take 650 mg by mouth every 8 hours as needed for mild pain or fever       albuterol (PROAIR HFA/PROVENTIL HFA/VENTOLIN HFA) 108 (90 Base) MCG/ACT inhaler Inhale 2 puffs into the lungs 4 times daily as needed for shortness of breath, wheezing or cough       amiodarone (PACERONE) 200 MG tablet Take 400 mg by mouth 2 times daily 2 tabs (400 mg) PO BID for 2 days then discontinue       amiodarone (PACERONE) 200 MG tablet Take 400 mg by mouth daily 2 tabs (400 mg) PO once daily for 14 days then discontinue       amiodarone (PACERONE) 200 MG tablet Take 200 mg by mouth daily       aspirin 81 MG EC tablet Take 162 mg by mouth daily       atorvastatin (LIPITOR) 80 MG tablet Take 80 mg by mouth daily       bisoprolol (ZEBETA) 5 MG tablet Take 5 mg by mouth daily       buPROPion (WELLBUTRIN) 75 MG tablet Take 120 mg by mouth 2 times daily       clonazePAM (KLONOPIN) 0.5 MG tablet Take 0.5 mg by mouth daily as needed for anxiety       cyclobenzaprine (FLEXERIL) 5 MG tablet Take 5 mg by mouth 3 times daily For 14 days then discontinue       diclofenac (VOLTAREN) 1 % topical gel Apply 2 g topically 4 times daily       diphenhydrAMINE (BENADRYL) 25 MG capsule Take 25 mg by mouth nightly as needed for itching or allergies       DULoxetine (CYMBALTA) 30 MG capsule Take 30 mg by mouth daily       fexofenadine (ALLEGRA) 60 MG tablet Take 60 mg by mouth 2 times daily as needed for  allergies       furosemide (LASIX) 40 MG tablet Take 20 mg by mouth daily       gabapentin (NEURONTIN) 600 MG tablet Take 300-900 mg by mouth 3 times daily 300 mg PO every morning and afternoon; 900 mg every evening at bedtime       levothyroxine (SYNTHROID/LEVOTHROID) 25 MCG tablet Take 25 mcg by mouth three times a week       lisinopril (ZESTRIL) 5 MG tablet Take 5 mg by mouth daily       magnesium oxide (MAG-OX) 400 MG tablet Take 400 mg by mouth daily       melatonin 3 MG tablet Take 3 mg by mouth nightly as needed for sleep       Multiple Vitamins-Minerals (MULTIVITAMIN ADULTS) TABS Take 1 tablet by mouth daily       nitroGLYcerin (NITROSTAT) 0.4 MG sublingual tablet Place 0.4 mg under the tongue every 5 minutes as needed for chest pain For chest pain place 1 tablet under the tongue every 5 minutes for 3 doses. If symptoms persist 5 minutes after 1st dose call 911.       spironolactone (ALDACTONE) 25 MG tablet Take 25 mg by mouth daily       sulfamethoxazole-trimethoprim (BACTRIM DS) 800-160 MG tablet Take 1 tablet by mouth 2 times daily       traMADol HCl 25 MG TABS tablet Take 1 tablet (25 mg) by mouth every 6 hours as needed (pain) 30 tablet 0     traZODone (DESYREL) 50 MG tablet Take 50 mg by mouth nightly as needed for sleep       vitamin B-12 (CYANOCOBALAMIN) 1000 MCG tablet Take 1,000 mcg by mouth daily       vitamin D3 (CHOLECALCIFEROL) 1.25 MG (32104 UT) capsule Take 50,000 Units by mouth daily         REVIEW OF SYSTEMS:  4 point ROS neg other than the symptoms noted above in the HPI.        PHYSICAL EXAM:  BP 96/68   Pulse 68   Temp 98.3  F (36.8  C)   Resp 16   Wt 112.9 kg (248 lb 12.8 oz)   SpO2 96%   BMI 38.39 kg/m    Vikki was sitting in a chair in her room.  Alert and oriented x2  A1 with mobility.  Using the 4WW with therapy otherwise wheelchair used for on the unit.    Heart rate regular/irregular and distant tones.  Lungs are clear.    Calves are firm to touch.    Abdomen is large,  round, soft to touch.  Decreased bowel sounds.    Continent and incontinent of bladder.      Component      Latest Ref Rng 5/20/2024  12:35 PM 5/23/2024  7:01 AM   Sodium      135 - 145 mmol/L 138  133 (L)    Potassium      3.4 - 5.3 mmol/L 5.2  5.2    Chloride      98 - 107 mmol/L 99  98    Carbon Dioxide (CO2)      22 - 29 mmol/L 25  24    Anion Gap      7 - 15 mmol/L 14  11    Urea Nitrogen      8.0 - 23.0 mg/dL 31.0 (H)  30.8 (H)    Creatinine      0.51 - 0.95 mg/dL 1.53 (H)  1.44 (H)    GFR Estimate      >60 mL/min/1.73m2 38 (L)  41 (L)    Calcium      8.8 - 10.2 mg/dL 9.6  9.2    Glucose      70 - 99 mg/dL 109 (H)  86          ASSESSMENT / PLAN:  (I10) Hypertension, unspecified type  (primary encounter diagnosis)  (E86.1) Hypotension due to hypovolemia  Comment: waiting on BMP from today but reviewed last two BMPs and see the Cr has improved but still elevated.  No IV at this time.  Did attempt but stopped.  Cardiology did not think it was necessary?    Currently has Lasix 20mg daily, bisoprolol 5mg daily and lisinopril 5mg daily.  Today her morning B/P was systolically in the 90's.  Would hold the medications and speak with cardiology.  Ok if she does not get them today.  Other suggestion is to spread them out and not give all in AM since the beta blocker and lisinopril are daily.      (I48.91) Atrial fibrillation with RVR (H)  Comment: INR done and waiting on orders from the coumadin clinic.  Does take amiodarone 200mg in AM.  Pulses are in the 50-60's at times of checking vitals.    Did hear that she may undergo a AV node ablation on 5/30/24.    (I50.33) Acute on chronic diastolic heart failure (H)  Comment: no acute symptoms at this time.  Lasix 20mg po daily.  Of the medications affecting her B/P, feel this is less likely going to be the one to lower her.  Encourage her to elevate legs, keep up with therapies to ambulate as this will help with edema..       Orders:  \orders from cardiology clinic stated:  ok to hold B/P medications:  lisinopril, lasix and Bisoprolol if SBP <100     Electronically signed by  MING Bell CNP               Sincerely,        MING Bell CNP

## 2024-05-24 NOTE — PROGRESS NOTES
ANTICOAGULATION MANAGEMENT     Vikki Evans 63 year old female is on warfarin with supratherapeutic INR result. (Goal INR 2.0-3.0)    Recent labs: (last 7 days)     05/24/24  1352   INR 3.1*       ASSESSMENT     Source(s): Chart Review and Home Care/Facility Nurse     Warfarin doses taken: Warfarin taken as instructed  Diet: No new diet changes identified  Medication/supplement changes:  Bactrim 7 day course (dates: 5/22-5/28) subsequent INRs may be decreased. Closer INR monitoring recommended. and With Sulfamethoxazole-Trimethoprim, [facility] protocol Appendix G recommends empiric reduction of warfarin dose in therapeutic/supratherapeutic patients.  Empirically reduced warfarin dosing 20% on the calendar  Amiodarone dose decreased 5/22/24  New illness, injury, or hospitalization: Yes: UTI, recent fall per provider notes, no fractures noted  Signs or symptoms of bleeding or clotting: No  Previous result: Therapeutic last 2(+) visits  Additional findings: None       PLAN     Recommended plan for temporary change(s) affecting INR     Dosing Instructions:  Take 1 mg daily  with next INR in 4 days       Summary  As of 5/24/2024      Full warfarin instructions:  5/25: 1 mg; 5/27: 1 mg; Otherwise 2 mg every Mon, Wed, Sat; 1 mg all other days   Next INR check:  5/28/2024               Telephone call with [facility] nurse (medical care for seniors) who agrees to plan and repeated back plan correctly    Orders given to  Homecare nurse/facility to recheck    Education provided:   Interaction IS anticipated between warfarin and Bactrim and dose reduction of amiodarone  Importance of notifying anticoagulation clinic for: changes in medications; a sooner lab recheck maybe needed    Plan made per [facility] anticoagulation protocol    Kamila Rachel, RN  Anticoagulation Clinic  5/24/2024    _______________________________________________________________________     Anticoagulation Episode Summary       Current INR goal:   2.0-3.0   TTR:  84.3% (1 wk)   Target end date:  Indefinite   Send INR reminders to:  TAMMI MARQUES    Indications    Atrial fibrillation with RVR (H) [I48.91]             Comments:  A-Fib             Anticoagulation Care Providers       Provider Role Specialty Phone number    Nina Birch NP Referring Nurse Practitioner [phone]

## 2024-05-28 NOTE — PROGRESS NOTES
ANTICOAGULATION MANAGEMENT     Vikki Evans 63 year old female is on warfarin with supratherapeutic INR result. (Goal INR 2.0-3.0)    Recent labs: (last 7 days)     05/28/24  0000   INR 3.3*       ASSESSMENT     Source(s): Chart Review, Home Care/Facility Nurse, and Template     Warfarin doses taken: Warfarin taken as instructed  Diet: No new diet changes identified  Medication/supplement changes:  two days left of Bactrim (today and tomorrow)  New illness, injury, or hospitalization: No  Signs or symptoms of bleeding or clotting: No  Previous result: Supratherapeutic  Additional findings: None       PLAN     Recommended plan for temporary change(s) affecting INR     Dosing Instructions: hold dose then continue your current warfarin dose with next INR in 3 days       Summary  As of 5/28/2024      Full warfarin instructions:  5/28: Hold; Otherwise 2 mg every Mon, Wed, Sat; 1 mg all other days   Next INR check:  5/31/2024               Telephone call with [facility] nurse (medical care for seniors) who agrees to plan and repeated back plan correctly    Orders given to  Homecare nurse/facility to recheck    Education provided:   Interaction IS anticipated between warfarin and Bactrim    Plan made per [facility] anticoagulation protocol    Kamila Rachel, RN  Anticoagulation Clinic  5/28/2024    _______________________________________________________________________     Anticoagulation Episode Summary       Current INR goal:  2.0-3.0   TTR:  58.0% (1.6 wk)   Target end date:  Indefinite   Send INR reminders to:  TAMMI MARQUES    Indications    Atrial fibrillation with RVR (H) [I48.91]             Comments:  A-Fib             Anticoagulation Care Providers       Provider Role Specialty Phone number    Nina Birch NP Referring Nurse Practitioner [phone]

## 2024-05-28 NOTE — LETTER
5/28/2024        RE: Vikki Evans  [address] [facility] [address]         HEALTH GERIATRIC SERVICES    Code Status:  FULL CODE   Visit Type:   Chief Complaint   Patient presents with     TCU follow up     Facility:  [facility] ([facility]) [[id number]]         HPI: Vikki Evans is a 63 year old female who I am seeing today for follow up on the TCU. Past medical history includes   VT s/p ablation (2016), NSVT, paroxysmal atrial fibrillation/flutter, chronic HFrEF, CAD status post stents, MVR, TVR, hypertension, hyperlipidemia, NARESH, DM2, and depression. Pt presented with fatigue and nausea. She had recently been at the ER and had leukocytosis with questionable pulmonary infiltrate. She was sent home on Augmentin and Z pack for possible pneumonia. Progressive dyspnea with inappropriate ICD shock 2/t A fib with RVR. Pt found to have acute on chronic CHF with cardiogenic shock. Pt initially required O2 up to 10L. Device interrogated with mx inappropriate shocks of A fib. She was treated with IV amiodarone and Digoxin and transitioned to oral amiodarone.  DEANA done which did not demonstrate LV aneurysm. AC changed to Coumadin. She was treated with IV diuretics and transitioned to orals.     Cardiac CT showed:     No intracardiac mass or thrombus is detected.  No left atrial appendage thrombus.  Possible pseudoaneurysm versus contained rupture (1 x 0.8 cm) noted at the anterior/apical left ventricular wall.  No pericardial effusion.  Prominent apical calcification.  Biventricular ICD is present.  Left ventricular enlargement is present.     Transitional Care Course: Today pt sitting up in wheelchair. Pt was to undergo AV node ablation in am due to A fib with RVR. Today nursing staff reporting increased confusion with hallucinations over the weekend. She was recently diagnosed with UTI. UC on 5/20 grew  > 100,000 Citrobacter Koseri. She continues on Bactrim DS. She is talking nonsensical on visit today. A  febrile. She is also hypotensive with bp 84/55 on exam. No tachycardia. Late last week pt presented with hypotension and rise in creatine to 1.58. Fluids were ordered however cardiology did not want her to receive fluids and gave orders to continue diuretics and antihypertensives. They were updated again on Friday with like symptoms with no new orders. Today creatine 1.68 and K+ 5.4. Pt appears dry. Pt reporting acute chest pain. No radiation. Unable to give Nitroglycerin due to low bp. Pt had like episode over the weekend and was given 1 dose of Nitroglycerin. 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  [place] pharmacist consulted.  Patient started on levothyroxine 25 mcg 3 times weekly.  Will need ongoing monitoring of her thyroid function and when medication is discontinued will attempt to remove.  Chronic back pain with radiculopathy.  Pain control with tramadol, gabapentin and Flexeril.      Assessment/Plan:     UTI  -Urine culture grew > 100,000 Citrobacter Koseri.    -Patient initially started on Macrobid however sensitivities were intermediate.  Patient changed to Bactrim DS. Continue for 7 days.   -No leukocytosis.   -Encourage fluids.     Acute encephalopathy   Underlying cognitive impairment  -Pt now having hallucinations.   -Repeat UA/UC.   -No leukocytosis.     Atrial fibrillation with RVR (H)  S/P ICD (internal cardiac defibrillator) procedure  Acute Chest pain   -Amiodarone tapering now on 200 mg daily until AV node ablation procedure on 5/30/2024.  -Unable to give prn Nitroglycerin due to low bp.   -INR 3.3.   -Coumadin Clinic to dose INR.     Acute on chronic diastolic heart failure (H)  -Lasix 20 mg every day.   -Lisinopril 5 mg every day.   -Spironolactone 12.5 mg every day.   -every day weights.   -Follow up BMP with Creatine now 1.58. BMP pending today.     Dehydration  Hypotension  -most likely due to hypovolemia and meds.   -Creatine now 1.68 up from 1.3. Pt  appears dry.   -Cardiology does not want pt to receive IV fluids. Fluids initially ordered but discontinued.      Hypothyroidism  -medication induced from Amiodarone.   -TSH 15.60, T3 61, T4 0.81.   -Start levothyroxine 25 mcg 3/weekly.   -Pt will need ongoing monitoring of TSH. Plans to get off supplement once off amiodarone.       Low back pain with sciatica, sciatica laterality unspecified, unspecified back pain laterality, unspecified chronicity  Sciatica, right side  -Continue PTA gabapentin.   -Tramadol prn.   -Flexeril 5 mg TID.   -Pt followed out pt by Pain Team. .     Diabetes mellitus due to underlying condition with hyperosmolarity without coma, without long-term current use of insulin (H)  -diet controlled.       Ok to send to [facility] for further eval and work up.     Active Ambulatory Problems     Diagnosis Date Noted     Acute on chronic HFrEF (heart failure with reduced ejection fraction) (H) 04/29/2024     Chronic systolic heart failure (H) 08/06/2012     Coronary atherosclerosis 05/07/2024     Contusion of shoulder 10/07/2016     Continuous severe abdominal pain 12/31/2015     Circadian rhythm sleep disorder 07/06/2017     Cervical high risk HPV (human papillomavirus) test positive 05/11/2023     Bilateral lower extremity edema 05/11/2023     Anemia, unspecified 04/30/2013     Allergic rhinitis, mild 05/07/2024     Hyperparathyroidism (H24) 10/07/2012     Hyperlipidemia LDL goal <70 11/19/2011     History of sepsis 03/18/2024     History of nephrolithiasis 01/13/2013     Hand pain 10/25/2014     DEISI (generalized anxiety disorder) 02/23/2016     Elevated troponin 12/03/2022     Dysthymia 09/23/2015     Degenerative disc disease, lumbar 01/05/2024     Decreased functional mobility and endurance 01/05/2024     Cough 05/17/2018     Hypertension 05/07/2024     Osteoarthritis of both hips 10/31/2023     Open wound of skin 04/09/2023     Obstructive sleep apnea 05/07/2024     Nevus of left foot  12/31/2015     Neck pain, chronic 04/18/2012     Chronic pain disorder 05/17/2018     Major depressive disorder, recurrent severe without psychotic features (H) 01/10/2023     Leukocytosis 12/03/2022     Lactic acidosis 04/29/2024     ICD (implantable cardioverter-defibrillator) in place 12/03/2022     Pre-syncope 12/03/2022     Paroxysmal atrial fibrillation (H) 10/31/2023     Atrial fibrillation with RVR (H) 04/29/2024     Pain in joint 10/25/2014     Osteopenia 07/16/2012     Osteoarthritis of both knees 06/18/2018     Type 2 diabetes mellitus with complication, without long-term current use of insulin (H) 09/16/2006     Tension type headache 03/04/2014     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     Sciatica, right side 05/11/2023     S/P gastric bypass 09/28/2012     Restless legs 04/19/2016     Vitamin D deficiency 09/19/2007     Ventricular tachycardia (H) 11/01/2022     Venous stasis dermatitis of both lower extremities 05/11/2023     COVID-19 12/01/2021     Low back pain 04/18/2012     MDD (major depressive disorder), recurrent episode, moderate (H) 02/23/2016     Primary osteoarthritis of both knees 06/18/2018     Resolved Ambulatory Problems     Diagnosis Date Noted     No Resolved Ambulatory Problems     No Additional Past Medical History     Allergies   Allergen Reactions     Aspirin Other (See Comments)     History of gastric bypass surgery     Cats Other (See Comments)     Runny nose     Cephalexin Other (See Comments)     Insomnia     Dust Mites Other (See Comments)     Runny nose     Ibuprofen Other (See Comments)     History of gastric bypass surgery     Nsaids Other (See Comments)     History of gastric bypass surgery       All Meds and Allergies reviewed in the record at the facility and is the most up-to-date.    Current Outpatient Medications   Medication Sig Dispense Refill     acetaminophen (ACETAMINOPHEN 8 HOUR) 650 MG CR tablet Take 650 mg by mouth every 8 hours as needed for  mild pain or fever       albuterol (PROAIR HFA/PROVENTIL HFA/VENTOLIN HFA) 108 (90 Base) MCG/ACT inhaler Inhale 2 puffs into the lungs 4 times daily as needed for shortness of breath, wheezing or cough       amiodarone (PACERONE) 200 MG tablet Take 400 mg by mouth 2 times daily 2 tabs (400 mg) PO BID for 2 days then discontinue       amiodarone (PACERONE) 200 MG tablet Take 400 mg by mouth daily 2 tabs (400 mg) PO once daily for 14 days then discontinue       amiodarone (PACERONE) 200 MG tablet Take 200 mg by mouth daily       aspirin 81 MG EC tablet Take 162 mg by mouth daily       atorvastatin (LIPITOR) 80 MG tablet Take 80 mg by mouth daily       bisoprolol (ZEBETA) 5 MG tablet Take 5 mg by mouth daily       buPROPion (WELLBUTRIN) 75 MG tablet Take 120 mg by mouth 2 times daily       clonazePAM (KLONOPIN) 0.5 MG tablet Take 0.5 mg by mouth daily as needed for anxiety       diclofenac (VOLTAREN) 1 % topical gel Apply 2 g topically 4 times daily       diphenhydrAMINE (BENADRYL) 25 MG capsule Take 25 mg by mouth nightly as needed for itching or allergies       DULoxetine (CYMBALTA) 30 MG capsule Take 30 mg by mouth daily       fexofenadine (ALLEGRA) 60 MG tablet Take 60 mg by mouth 2 times daily as needed for allergies       furosemide (LASIX) 40 MG tablet Take 20 mg by mouth daily       gabapentin (NEURONTIN) 600 MG tablet Take 300-900 mg by mouth 3 times daily 300 mg PO every morning and afternoon; 900 mg every evening at bedtime       levothyroxine (SYNTHROID/LEVOTHROID) 25 MCG tablet Take 25 mcg by mouth three times a week       lisinopril (ZESTRIL) 5 MG tablet Take 5 mg by mouth daily       magnesium oxide (MAG-OX) 400 MG tablet Take 400 mg by mouth daily       melatonin 3 MG tablet Take 3 mg by mouth nightly as needed for sleep       Multiple Vitamins-Minerals (MULTIVITAMIN ADULTS) TABS Take 1 tablet by mouth daily       nitroGLYcerin (NITROSTAT) 0.4 MG sublingual tablet Place 0.4 mg under the tongue every 5  "minutes as needed for chest pain For chest pain place 1 tablet under the tongue every 5 minutes for 3 doses. If symptoms persist 5 minutes after 1st dose call 911.       spironolactone (ALDACTONE) 25 MG tablet Take 25 mg by mouth daily       traMADol HCl 25 MG TABS tablet Take 1 tablet (25 mg) by mouth every 6 hours as needed (pain) 30 tablet 0     traZODone (DESYREL) 50 MG tablet Take 50 mg by mouth nightly as needed for sleep       vitamin B-12 (CYANOCOBALAMIN) 1000 MCG tablet Take 1,000 mcg by mouth daily       vitamin D3 (CHOLECALCIFEROL) 1.25 MG (59501 UT) capsule Take 50,000 Units by mouth daily       No current facility-administered medications for this visit.       REVIEW OF SYSTEMS:   10 point review of systems reviewed and pertinent positives in the HPI.     PHYSICAL EXAMINATION:  Physical Exam     Vital signs: BP (!) 88/58   Pulse 60   Temp 97.1  F (36.2  C)   Resp 16   Ht 1.715 m (5' 7.5\")   Wt 111.2 kg (245 lb 3.2 oz)   SpO2 99%   BMI 37.84 kg/m    General: Awake, Alert, oriented x1,  rambling nonsensical.   HEENT:Pink conjunctiva, moist oral mucosa  NECK: Supple  CVS:  S1  S2, without murmur or gallop.   LUNG: Clear to auscultation, No wheezes, rales or rhonci.   BACK: No kyphosis of the thoracic spine  ABDOMEN: Soft, obese, non tender to palpation, with positive bowel sounds  EXTREMITIES: Moves both upper and lower extremities with generalized weakness, trace pedal edema, no calf tenderness  SKIN: Warm and dry  NEUROLOGIC: Intact, pulses palpable  PSYCHIATRIC: Confused with rambling nonsensical.       Labs:  All labs reviewed in the nursing home record and Epic     Voicemail left for her sister.     This note has been dictated using voice recognition software. Any grammatical or context distortions are unintentional and inherent to the software    Electronically signed by: Nina Birch CNP       Sincerely,        Nina Birch NP      "

## 2024-05-28 NOTE — PROGRESS NOTES
Preoperative Evaluation  [facility]ATR[facility]  [address] [place] [address]  Phone: [phone]  Fax: [phone]  Primary Provider: Lima Archuleta MD  Pre-op Performing Provider: Nina Birch NP  May 28, 2024   {Provider  Link to PREOP SmartSet  REQUIRED to apply standard patient instructions and medication directions to the AVS :803294}  {ROOMER review and update patient entered surgical information if needed :591471}  { After Pre-op is completed, use lists to pull documentation into note Link to complete Pre-Op    :109158}  {Pull Surgical Information into note after flowsheet completed:191855}  Fax number for surgical facility: {:207091}    {Provider Charting Preference for Preop :152038}    Elan Florez is a 63 year old, presenting for the following:  Pre-Op Exam        HPI related to upcoming procedure: ***  {Pull Pre-Op Questionnaire into note after flowsheet completed:752031}  Health Care Directive  Patient does not have a Health Care Directive or Living Will: {ADVANCE_DIRECTIVE_STATUS:530865}    Preoperative Review of   {Mnpmpreport:675659}  {Review MNPMP for all patients per [facility] MNPMP Profile:283585}    {Chronic problem details (Optional) :545097}    Patient Active Problem List    Diagnosis Date Noted    Coronary atherosclerosis 05/07/2024     Priority: Medium     Formatting of this note is different from the original.    1. History of Anterior wall MI in 2001   2. 2001:Coronary angiogram demonstrating 40% proximal LAD lesion consistent with ruptured plaque with spontaneous thrombolysis.   3. 2012: PERCUTANEOUS CORONARY INTERVENTION to proximal LAD with 3.5x 28mmDES and Ramus Intermedius with 2.5 x 20mm DRUG ELUTING STENTS.    4. Mild disease in LCx system and moderate disease in rca -- medical management recommended      Allergic rhinitis, mild 05/07/2024     Priority: Medium     Formatting of this note might be different from the original.   Allegra  -  Dr. Negron   5/2012 - nasonex, and allegra as needed.      Hypertension 05/07/2024     Priority: Medium     Formatting of this note might be different from the original.   Goal BP: < 130/80, Tolerating ASA: Yes   Meds (ineffective, intolerant):     Meds (effective/tolerated): metoprolol, lisinopril, furosemide   Potential future meds:    Imaging: Last 2d echo: technically difficult,   ejection fraction of 50%.   Mild left atrial enlargement and pulmonary artery systolic pressure   of upper limit of normal.      Obstructive sleep apnea 05/07/2024     Priority: Medium     Formatting of this note might be different from the original.   3/2013 - Using CPAP compliantly.  polysomnogram - prolonged REM latency (almost 4 hours), obstructive sleep apnea not explaining persistent sleepiness      Acute on chronic HFrEF (heart failure with reduced ejection fraction) (H) 04/29/2024     Priority: Medium    Lactic acidosis 04/29/2024     Priority: Medium    Atrial fibrillation with RVR (H) 04/29/2024     Priority: Medium    History of sepsis 03/18/2024     Priority: Medium    Degenerative disc disease, lumbar 01/05/2024     Priority: Medium    Decreased functional mobility and endurance 01/05/2024     Priority: Medium    Osteoarthritis of both hips 10/31/2023     Priority: Medium    Paroxysmal atrial fibrillation (H) 10/31/2023     Priority: Medium    Cervical high risk HPV (human papillomavirus) test positive 05/11/2023     Priority: Medium     Formatting of this note might be different from the original.   04/13/2023 UNS/HPV+, HPV 16+, HPV 18 Negative       Plan: Colposcopy      Bilateral lower extremity edema 05/11/2023     Priority: Medium    Sciatica, right side 05/11/2023     Priority: Medium    Venous stasis dermatitis of both lower extremities 05/11/2023     Priority: Medium    Open wound of skin 04/09/2023     Priority: Medium     Formatting of this note might be different from the original.   Noted at hospital admission  12/1/23.  Initially consistent with shingles.  Later developed characteristics of pressure/moisture wounds, improved with wound management.      Major depressive disorder, recurrent severe without psychotic features (H) 01/10/2023     Priority: Medium    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     Priority: Medium    Elevated troponin 12/03/2022     Priority: Medium    Leukocytosis 12/03/2022     Priority: Medium    ICD (implantable cardioverter-defibrillator) in place 12/03/2022     Priority: Medium    Pre-syncope 12/03/2022     Priority: Medium    Ventricular tachycardia (H) 11/01/2022     Priority: Medium    COVID-19 12/01/2021     Priority: Medium    Osteoarthritis of both knees 06/18/2018     Priority: Medium     Formatting of this note might be different from the original.   12/2016 - Per Rheumatology      Primary osteoarthritis of both knees 06/18/2018     Priority: Medium     Formatting of this note might be different from the original.   Formatting of this note might be different from the original.   12/2016 - Per Rheumatology      Cough 05/17/2018     Priority: Medium    Chronic pain disorder 05/17/2018     Priority: Medium    Circadian rhythm sleep disorder 07/06/2017     Priority: Medium    Contusion of shoulder 10/07/2016     Priority: Medium    Restless legs 04/19/2016     Priority: Medium     Formatting of this note might be different from the original.   Dr. Novoa (psychiatry) - better with gabapentin      DEISI (generalized anxiety disorder) 02/23/2016     Priority: Medium    MDD (major depressive disorder), recurrent episode, moderate (H) 02/23/2016     Priority: Medium    Continuous severe abdominal pain 12/31/2015     Priority: Medium    Nevus of left foot 12/31/2015     Priority: Medium    Dysthymia 09/23/2015     Priority: Medium    Hand pain 10/25/2014     Priority: Medium     Formatting of this note might be different from the original.   Especially at distal interphalangeal joints    10/7/14 - Dr. Espana - consider prednisone for two weeks, labs.  If labs normal, try cymbalta   10/22/14 - Dr. Espana - osteoarthritis changes on xray, prednisone not helpful (although morning stiffness decreased from 60 to 30 minutes)  Start plaquenil for 4 weeks. Tramadol specifically for hand pain      Pain in joint 10/25/2014     Priority: Medium     Formatting of this note might be different from the original.   10/22/14 - Dr. Espana (rheum) - no benefit from prednisone, in spite of elevated ESR, so will try plaquenil   2/2015 - Dr. Espana - doing better, continue plaquenil.      Tension type headache 03/04/2014     Priority: Medium     Formatting of this note might be different from the original.   1/2014 - physical therapy at [facility], amitriptyline, pain clinic - consider acupuncture, other approaches.  Resume exercises from previous course at [facility] physical therapy.   2/2014 - better with amitriptyline.   3/2014 - has not scheduled at [facility]      Anemia, unspecified 04/30/2013     Priority: Medium     Formatting of this note is different from the original.   HEMOGLOBIN                (g/dL)    Date Value    02/15/2022 11.0 (L)      History of nephrolithiasis 01/13/2013     Priority: Medium     Formatting of this note might be different from the original.   3/2012 - [facility] - hyperoxaluria - milk with every meal, and urocit k 40meq, follow-up 6 months   1/2013 - Klebsiella oxytoca urinary tract infection      Hyperparathyroidism (H24) 10/07/2012     Priority: Medium     Formatting of this note might be different from the original.   9/28/12 - Dr. Quintana - get additional labs, will recommend calcium and Vitamin D intake.      S/P gastric bypass 09/28/2012     Priority: Medium     Formatting of this note might be different from the original.   Sept 2007, s/p Kamla-en-Y (Dr. Castro)      Chronic systolic heart failure (H) 08/06/2012     Priority: Medium      Formatting of this note might be different from the original.   2007: LEFT VENTRICULAR EJECTION FRACTION 50% by echocardiogram, 33% by MPI.    2011: EF 31% by cardiac MRI    2013 S/p single chamber ICD    2016: Admitted with syncope and VT resolving with ATP. Underwent VT ablation and upgrade to BiV ICD   2019: EF 20-25%      Osteopenia 07/16/2012     Priority: Medium     Formatting of this note might be different from the original.   DEXA 7/16/12 - baseline, diagnosis    DEXA stable 7/2014 5/2019 - wrist fracture, bones appear demineralized on xray      Neck pain, chronic 04/18/2012     Priority: Medium    Low back pain 04/18/2012     Priority: Medium     Formatting of this note might be different from the original.   Formatting of this note might be different from the original.   Nerve block deferred until able to go off Plavix (1 year after stent)   11/2013 - has had 2 injections, considering third.  Seeing pain clinic, hoping to wean off narcotics   5/25/2017 - ran out of tramadol in April, waiting to see how she does, will see Dr. Espana in June      Hyperlipidemia LDL goal <70 11/19/2011     Priority: Medium     Formatting of this note might be different from the original.   3/2017 - Dr. Wright - most recent LDL 98, increase Lipitor (atorvastatin) to 80mg   3/2022 - on Crestor (rosuvastatin) 40mg      Vitamin D deficiency 09/19/2007     Priority: Medium     Formatting of this note is different from the original.   9/2007 Vitamin D = 17.0   Controlled with oral supplement   10,000 daily from Feb - May 2012,    Then 5000 /day from May 2012   VITAMIN D TOTAL    Date Value Ref Range Status    10/21/2015 30.5 30.0-80.0 ng/mL Final    09/11/2014 39.5 30.0-80.0 ng/mL Final    04/15/2013 49.1 30.0 - 80.0 ng/mL Final    09/28/2012 44.8 30.0 - 80.0 ng/mL Final      Type 2 diabetes mellitus with complication, without long-term current use of insulin (H) 09/16/2006     Priority: Medium     Formatting of this note is  different from the original.   2007 Dr Peguero - metformin, stopped after Kamla-en-Y surgery   HEMOGLOBIN A1C MONITORING (POCT)    Date Value    03/02/2021 6.1 %    04/15/2013 5.9 % Total Hgb       HEMOGLOBIN A1C SCREENING (% Total Hgb)    Date Value    03/22/2015 6.0        No past medical history on file.  No past surgical history on file.  Current Outpatient Medications   Medication Sig Dispense Refill    acetaminophen (ACETAMINOPHEN 8 HOUR) 650 MG CR tablet Take 650 mg by mouth every 8 hours as needed for mild pain or fever      albuterol (PROAIR HFA/PROVENTIL HFA/VENTOLIN HFA) 108 (90 Base) MCG/ACT inhaler Inhale 2 puffs into the lungs 4 times daily as needed for shortness of breath, wheezing or cough      amiodarone (PACERONE) 200 MG tablet Take 400 mg by mouth 2 times daily 2 tabs (400 mg) PO BID for 2 days then discontinue      amiodarone (PACERONE) 200 MG tablet Take 400 mg by mouth daily 2 tabs (400 mg) PO once daily for 14 days then discontinue      amiodarone (PACERONE) 200 MG tablet Take 200 mg by mouth daily      aspirin 81 MG EC tablet Take 162 mg by mouth daily      atorvastatin (LIPITOR) 80 MG tablet Take 80 mg by mouth daily      bisoprolol (ZEBETA) 5 MG tablet Take 5 mg by mouth daily      buPROPion (WELLBUTRIN) 75 MG tablet Take 120 mg by mouth 2 times daily      clonazePAM (KLONOPIN) 0.5 MG tablet Take 0.5 mg by mouth daily as needed for anxiety      diclofenac (VOLTAREN) 1 % topical gel Apply 2 g topically 4 times daily      diphenhydrAMINE (BENADRYL) 25 MG capsule Take 25 mg by mouth nightly as needed for itching or allergies      DULoxetine (CYMBALTA) 30 MG capsule Take 30 mg by mouth daily      fexofenadine (ALLEGRA) 60 MG tablet Take 60 mg by mouth 2 times daily as needed for allergies      furosemide (LASIX) 40 MG tablet Take 20 mg by mouth daily      gabapentin (NEURONTIN) 600 MG tablet Take 300-900 mg by mouth 3 times daily 300 mg PO every morning and afternoon; 900 mg every evening at  "bedtime      levothyroxine (SYNTHROID/LEVOTHROID) 25 MCG tablet Take 25 mcg by mouth three times a week      lisinopril (ZESTRIL) 5 MG tablet Take 5 mg by mouth daily      magnesium oxide (MAG-OX) 400 MG tablet Take 400 mg by mouth daily      melatonin 3 MG tablet Take 3 mg by mouth nightly as needed for sleep      Multiple Vitamins-Minerals (MULTIVITAMIN ADULTS) TABS Take 1 tablet by mouth daily      nitroGLYcerin (NITROSTAT) 0.4 MG sublingual tablet Place 0.4 mg under the tongue every 5 minutes as needed for chest pain For chest pain place 1 tablet under the tongue every 5 minutes for 3 doses. If symptoms persist 5 minutes after 1st dose call 911.      spironolactone (ALDACTONE) 25 MG tablet Take 25 mg by mouth daily      traMADol HCl 25 MG TABS tablet Take 1 tablet (25 mg) by mouth every 6 hours as needed (pain) 30 tablet 0    traZODone (DESYREL) 50 MG tablet Take 50 mg by mouth nightly as needed for sleep      vitamin B-12 (CYANOCOBALAMIN) 1000 MCG tablet Take 1,000 mcg by mouth daily      vitamin D3 (CHOLECALCIFEROL) 1.25 MG (32163 UT) capsule Take 50,000 Units by mouth daily         Allergies   Allergen Reactions    Aspirin Other (See Comments)     History of gastric bypass surgery    Cats Other (See Comments)     Runny nose    Cephalexin Other (See Comments)     Insomnia    Dust Mites Other (See Comments)     Runny nose    Ibuprofen Other (See Comments)     History of gastric bypass surgery    Nsaids Other (See Comments)     History of gastric bypass surgery        Social History     Tobacco Use    Smoking status: Not on file    Smokeless tobacco: Not on file   Substance Use Topics    Alcohol use: Not on file     {FAMILY HISTORY (Optional):176777397}  History   Drug Use Not on file           {ROS Picklists (Optional):231367}    Objective    There were no vitals taken for this visit.   Estimated body mass index is 38.39 kg/m  as calculated from the following:    Height as of 5/23/24: 1.715 m (5' 7.5\").    " Weight as of 24: 112.9 kg (248 lb 12.8 oz).  Physical Exam  {Exam List (Optional):635180}    Recent Labs   Lab Test 24  0617 24  0000 24  1352 24  0659 24  0000 24  1235   HGB 11.2*  --   --   --   --  12.2     --   --   --   --  279   INR  --  3.3* 3.1*  --    < >  --    *  --   --  135   < > 138   POTASSIUM 5.4*  --   --  4.7   < > 5.2   CR 1.68*  --   --  1.47*   < > 1.53*    < > = values in this interval not displayed.        Diagnostics  {LABS:263634}   {EK}    Revised Cardiac Risk Index (RCRI)  The patient has the following serious cardiovascular risks for perioperative complications:  {PREOP REVISED CARDIAC RISK INDEX (RCRI) :778824}     RCRI Interpretation: {REVISED CARDIAC RISK INTERPRETATION :504827}         Signed Electronically by: Nina Birch NP  Copy of this evaluation report is provided to requesting physician.    {Provider Resources  Preop [facility] Preop Guidelines  Revised Cardiac Risk Index :818693}   {Email feedback regarding this note to [email or web address]   :745072}

## 2024-05-28 NOTE — PROGRESS NOTES
[facility] GERIATRIC SERVICES    Code Status:  FULL CODE   Visit Type:   Chief Complaint   Patient presents with    TCU follow up     Facility:  [facility] ([facility]) [[id number]]         HPI: Vikki Evans is a 63 year old female who I am seeing today for follow up on the TCU. Past medical history includes   VT s/p ablation (2016), NSVT, paroxysmal atrial fibrillation/flutter, chronic HFrEF, CAD status post stents, MVR, TVR, hypertension, hyperlipidemia, NARESH, DM2, and depression. Pt presented with fatigue and nausea. She had recently been at the ER and had leukocytosis with questionable pulmonary infiltrate. She was sent home on Augmentin and Z pack for possible pneumonia. Progressive dyspnea with inappropriate ICD shock 2/t A fib with RVR. Pt found to have acute on chronic CHF with cardiogenic shock. Pt initially required O2 up to 10L. Device interrogated with mx inappropriate shocks of A fib. She was treated with IV amiodarone and Digoxin and transitioned to oral amiodarone.  DEANA done which did not demonstrate LV aneurysm. AC changed to Coumadin. She was treated with IV diuretics and transitioned to orals.     Cardiac CT showed:     No intracardiac mass or thrombus is detected.  No left atrial appendage thrombus.  Possible pseudoaneurysm versus contained rupture (1 x 0.8 cm) noted at the anterior/apical left ventricular wall.  No pericardial effusion.  Prominent apical calcification.  Biventricular ICD is present.  Left ventricular enlargement is present.     Transitional Care Course: Today pt sitting up in wheelchair. Pt was to undergo AV node ablation in am due to A fib with RVR. Today nursing staff reporting increased confusion with hallucinations over the weekend. She was recently diagnosed with UTI. UC on 5/20 grew  > 100,000 Citrobacter Koseri. She continues on Bactrim DS. She is talking nonsensical on visit today. A febrile. She is also hypotensive with bp 84/55 on exam. No tachycardia. Late last week pt  presented with hypotension and rise in creatine to 1.58. Fluids were ordered however cardiology did not want her to receive fluids and gave orders to continue diuretics and antihypertensives. They were updated again on Friday with like symptoms with no new orders. Today creatine 1.68 and K+ 5.4. Pt appears dry. Pt reporting acute chest pain. No radiation. Unable to give Nitroglycerin due to low bp. Pt had like episode over the weekend and was given 1 dose of Nitroglycerin. 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  [facility] pharmacist consulted.  Patient started on levothyroxine 25 mcg 3 times weekly.  Will need ongoing monitoring of her thyroid function and when medication is discontinued will attempt to remove.  Chronic back pain with radiculopathy.  Pain control with tramadol, gabapentin and Flexeril.      Assessment/Plan:     UTI  -Urine culture grew > 100,000 Citrobacter Koseri.    -Patient initially started on Macrobid however sensitivities were intermediate.  Patient changed to Bactrim DS. Continue for 7 days.   -No leukocytosis.   -Encourage fluids.     Acute encephalopathy   Underlying cognitive impairment  -Pt now having hallucinations.   -Repeat UA/UC.   -No leukocytosis.     Atrial fibrillation with RVR (H)  S/P ICD (internal cardiac defibrillator) procedure  Acute Chest pain   -Amiodarone tapering now on 200 mg daily until AV node ablation procedure on 5/30/2024.  -Unable to give prn Nitroglycerin due to low bp.   -INR 3.3.   -Coumadin Clinic to dose INR.     Acute on chronic diastolic heart failure (H)  -Lasix 20 mg every day.   -Lisinopril 5 mg every day.   -Spironolactone 12.5 mg every day.   -every day weights.   -Follow up BMP with Creatine now 1.58. BMP pending today.     Dehydration  Hypotension  -most likely due to hypovolemia and meds.   -Creatine now 1.68 up from 1.3. Pt appears dry.   -Cardiology does not want pt to receive IV fluids. Fluids initially ordered but  discontinued.      Hypothyroidism  -medication induced from Amiodarone.   -TSH 15.60, T3 61, T4 0.81.   -Start levothyroxine 25 mcg 3/weekly.   -Pt will need ongoing monitoring of TSH. Plans to get off supplement once off amiodarone.       Low back pain with sciatica, sciatica laterality unspecified, unspecified back pain laterality, unspecified chronicity  Sciatica, right side  -Continue PTA gabapentin.   -Tramadol prn.   -Flexeril 5 mg TID.   -Pt followed out pt by Pain Team. .     Diabetes mellitus due to underlying condition with hyperosmolarity without coma, without long-term current use of insulin (H)  -diet controlled.       Ok to send to [facility] for further eval and work up.     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    Cervical high risk HPV (human papillomavirus) test positive 05/11/2023    Bilateral lower extremity edema 05/11/2023    Anemia, unspecified 04/30/2013    Allergic rhinitis, mild 05/07/2024    Hyperparathyroidism (H24) 10/07/2012    Hyperlipidemia LDL goal <70 11/19/2011    History of sepsis 03/18/2024    History of nephrolithiasis 01/13/2013    Hand pain 10/25/2014    DEISI (generalized anxiety disorder) 02/23/2016    Elevated troponin 12/03/2022    Dysthymia 09/23/2015    Degenerative disc disease, lumbar 01/05/2024    Decreased functional mobility and endurance 01/05/2024    Cough 05/17/2018    Hypertension 05/07/2024    Osteoarthritis of both hips 10/31/2023    Open wound of skin 04/09/2023    Obstructive sleep apnea 05/07/2024    Nevus of left foot 12/31/2015    Neck pain, chronic 04/18/2012    Chronic pain disorder 05/17/2018    Major depressive disorder, recurrent  severe without psychotic features (H) 01/10/2023    Leukocytosis 12/03/2022    Lactic acidosis 04/29/2024    ICD (implantable cardioverter-defibrillator) in place 12/03/2022    Pre-syncope 12/03/2022    Paroxysmal atrial fibrillation (H) 10/31/2023    Atrial fibrillation with RVR (H) 04/29/2024    Pain in joint 10/25/2014    Osteopenia 07/16/2012    Osteoarthritis of both knees 06/18/2018    Type 2 diabetes mellitus with complication, without long-term current use of insulin (H) 09/16/2006    Tension type headache 03/04/2014    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    Sciatica, right side 05/11/2023    S/P gastric bypass 09/28/2012    Restless legs 04/19/2016    Vitamin D deficiency 09/19/2007    Ventricular tachycardia (H) 11/01/2022    Venous stasis dermatitis of both lower extremities 05/11/2023    COVID-19 12/01/2021    Low back pain 04/18/2012    MDD (major depressive disorder), recurrent episode, moderate (H) 02/23/2016    Primary osteoarthritis of both knees 06/18/2018     Resolved Ambulatory Problems     Diagnosis Date Noted    No Resolved Ambulatory Problems     No Additional Past Medical History     Allergies   Allergen Reactions    Aspirin Other (See Comments)     History of gastric bypass surgery    Cats Other (See Comments)     Runny nose    Cephalexin Other (See Comments)     Insomnia    Dust Mites Other (See Comments)     Runny nose    Ibuprofen Other (See Comments)     History of gastric bypass surgery    Nsaids Other (See Comments)     History of gastric bypass surgery       All Meds and Allergies reviewed in the record at the facility and is the most up-to-date.    Current Outpatient Medications   Medication Sig Dispense Refill    acetaminophen (ACETAMINOPHEN 8 HOUR) 650 MG CR tablet Take 650 mg by mouth every 8 hours as needed for mild pain or fever      albuterol (PROAIR HFA/PROVENTIL HFA/VENTOLIN HFA) 108 (90 Base) MCG/ACT inhaler Inhale 2 puffs into the lungs 4 times daily as  needed for shortness of breath, wheezing or cough      amiodarone (PACERONE) 200 MG tablet Take 400 mg by mouth 2 times daily 2 tabs (400 mg) PO BID for 2 days then discontinue      amiodarone (PACERONE) 200 MG tablet Take 400 mg by mouth daily 2 tabs (400 mg) PO once daily for 14 days then discontinue      amiodarone (PACERONE) 200 MG tablet Take 200 mg by mouth daily      aspirin 81 MG EC tablet Take 162 mg by mouth daily      atorvastatin (LIPITOR) 80 MG tablet Take 80 mg by mouth daily      bisoprolol (ZEBETA) 5 MG tablet Take 5 mg by mouth daily      buPROPion (WELLBUTRIN) 75 MG tablet Take 120 mg by mouth 2 times daily      clonazePAM (KLONOPIN) 0.5 MG tablet Take 0.5 mg by mouth daily as needed for anxiety      diclofenac (VOLTAREN) 1 % topical gel Apply 2 g topically 4 times daily      diphenhydrAMINE (BENADRYL) 25 MG capsule Take 25 mg by mouth nightly as needed for itching or allergies      DULoxetine (CYMBALTA) 30 MG capsule Take 30 mg by mouth daily      fexofenadine (ALLEGRA) 60 MG tablet Take 60 mg by mouth 2 times daily as needed for allergies      furosemide (LASIX) 40 MG tablet Take 20 mg by mouth daily      gabapentin (NEURONTIN) 600 MG tablet Take 300-900 mg by mouth 3 times daily 300 mg PO every morning and afternoon; 900 mg every evening at bedtime      levothyroxine (SYNTHROID/LEVOTHROID) 25 MCG tablet Take 25 mcg by mouth three times a week      lisinopril (ZESTRIL) 5 MG tablet Take 5 mg by mouth daily      magnesium oxide (MAG-OX) 400 MG tablet Take 400 mg by mouth daily      melatonin 3 MG tablet Take 3 mg by mouth nightly as needed for sleep      Multiple Vitamins-Minerals (MULTIVITAMIN ADULTS) TABS Take 1 tablet by mouth daily      nitroGLYcerin (NITROSTAT) 0.4 MG sublingual tablet Place 0.4 mg under the tongue every 5 minutes as needed for chest pain For chest pain place 1 tablet under the tongue every 5 minutes for 3 doses. If symptoms persist 5 minutes after 1st dose call 911.       "spironolactone (ALDACTONE) 25 MG tablet Take 25 mg by mouth daily      traMADol HCl 25 MG TABS tablet Take 1 tablet (25 mg) by mouth every 6 hours as needed (pain) 30 tablet 0    traZODone (DESYREL) 50 MG tablet Take 50 mg by mouth nightly as needed for sleep      vitamin B-12 (CYANOCOBALAMIN) 1000 MCG tablet Take 1,000 mcg by mouth daily      vitamin D3 (CHOLECALCIFEROL) 1.25 MG (38061 UT) capsule Take 50,000 Units by mouth daily       No current facility-administered medications for this visit.       REVIEW OF SYSTEMS:   10 point review of systems reviewed and pertinent positives in the HPI.     PHYSICAL EXAMINATION:  Physical Exam     Vital signs: BP (!) 88/58   Pulse 60   Temp 97.1  F (36.2  C)   Resp 16   Ht 1.715 m (5' 7.5\")   Wt 111.2 kg (245 lb 3.2 oz)   SpO2 99%   BMI 37.84 kg/m    General: Awake, Alert, oriented x1,  rambling nonsensical.   HEENT:Pink conjunctiva, moist oral mucosa  NECK: Supple  CVS:  S1  S2, without murmur or gallop.   LUNG: Clear to auscultation, No wheezes, rales or rhonci.   BACK: No kyphosis of the thoracic spine  ABDOMEN: Soft, obese, non tender to palpation, with positive bowel sounds  EXTREMITIES: Moves both upper and lower extremities with generalized weakness, trace pedal edema, no calf tenderness  SKIN: Warm and dry  NEUROLOGIC: Intact, pulses palpable  PSYCHIATRIC: Confused with rambling nonsensical.       Labs:  All labs reviewed in the nursing home record and Epic     Voicemail left for her sister.     This note has been dictated using voice recognition software. Any grammatical or context distortions are unintentional and inherent to the software    Electronically signed by: Nina Birch CNP   "

## 2024-05-29 PROBLEM — R29.6 RECURRENT FALLS: Status: ACTIVE | Noted: 2024-01-01

## 2024-05-29 PROBLEM — R79.1 SUPRATHERAPEUTIC INR: Status: ACTIVE | Noted: 2024-01-01

## 2024-05-29 PROBLEM — N17.9 AKI (ACUTE KIDNEY INJURY) (H): Status: ACTIVE | Noted: 2024-01-01

## 2024-05-29 PROBLEM — I95.9 HYPOTENSION: Status: ACTIVE | Noted: 2024-01-01

## 2024-05-29 PROBLEM — E87.5 HYPERKALEMIA: Status: ACTIVE | Noted: 2024-01-01

## 2024-06-03 NOTE — LETTER
6/3/2024        RE: Vikki Evans  [address] [place] [address]        [facility]    Code Status:  FULL CODE   Visit Type:   Chief Complaint   Patient presents with     <9>TCU Readmit [employer]-5/28/2024-06/01/2024     Facility:  [facility] ([facility]) [[id number]]         HPI: Vikki Evans is a 63 year old female who I am seeing today for re admit to  the TCU. Past medical history includes VT s/p ablation (2016), NSVT, paroxysmal atrial fibrillation/flutter, chronic HFrEF, CAD status post stents, MVR, TVR, hypertension, hyperlipidemia, NAERSH, DM2, and depression. Pt rehospitalized on 5/28/2024 secondary to severe hypotension and acute kidney injury.  Patient with underlying A-fib with rapid ventricular response.  She underwent AV node ablation during recent hospitalization.  Her amiodarone was discontinued.  Upon admit to hospital an echocardiogram showed an ejection fracture of 15 to 20% which was at baseline.  Patient continued with orthostatic hypotension.  Cardiology was consulted and her cardiac medications were discontinued except for low-dose bisoprolol however this also was discontinued.  Recommendations were to wear pressure stockings and abdominal binder and use as needed midodrine if needed.  Her cortisol level was normal.  Chronic anemia secondary to history of gastric bypass.  Ferritin was elevated but iron stores were low.  She continues on iron supplementation.  Chronic anticoagulation with warfarin.       Transitional Care Course: Today pt sitting up in wheelchair.  She is more alert and talkative.  Patient status post AV node ablation.  Should have follow-up with cardiology today.  Surgical site intact.  Regular rate and rhythm noted on exam.  She reports having an EKG at her follow-up appointment however no records are returned.  She is voiding adequately.  She is complaining of some constipation.  She does continue on senna.  No shortness of breath or chest pain.   Chronic pain in her right hip secondary to DJD.  She continues on tramadol.    Assessment/Plan:     Atrial fibrillation with RVR (H)  S/P ICD (internal cardiac defibrillator) procedure  Status post AV node ablation  -Amiodarone discontinued.  -No longer on beta-blocker.  -Continues on warfarin for anticoagulant.  -INR 1.8.  -Coumadin Clinic to dose INR.     Acute on chronic diastolic heart failure (H)  -Patient no longer on lisinopril, spironolactone or Lasix secondary to hypotension.  -every day weights.   -Follow up BMP.     Hypotension  -No longer on cardiac medications.  -Monitor BP.    Hypothyroidism  -medication induced from Amiodarone.   -TSH 15.60, T3 61, T4 0.81.   -Continues on levothyroxine 25 mcg 3/weekly.   -Will attempt to discontinue now that she is off her amiodarone.  -Follow-up TSH.      Low back pain with sciatica, sciatica laterality unspecified, unspecified back pain laterality, unspecified chronicity  Sciatica, right side  Right hip pain.  -Continue PTA gabapentin.   -Tramadol prn.   -Flexeril 5 mg TID.   -Pt followed out pt by Pain Team.   -Follow-up with Ortho outpatient.    Diabetes mellitus due to underlying condition with hyperosmolarity without coma, without long-term current use of insulin (H)  -diet controlled.     -Okay for PT OT eval and treat.    Active Ambulatory Problems     Diagnosis Date Noted     Acute on chronic HFrEF (heart failure with reduced ejection fraction) (H) 04/29/2024     Chronic systolic heart failure (H) 08/06/2012     Coronary atherosclerosis 05/07/2024     Contusion of shoulder 10/07/2016     Continuous severe abdominal pain 12/31/2015     Circadian rhythm sleep disorder 07/06/2017     Cervical high risk HPV (human papillomavirus) test positive 05/11/2023     Bilateral lower extremity edema 05/11/2023     Anemia, unspecified 04/30/2013     Allergic rhinitis, mild 05/07/2024     Hyperparathyroidism (H24) 10/07/2012     Hyperlipidemia LDL goal <70 11/19/2011      History of sepsis 03/18/2024     History of nephrolithiasis 01/13/2013     Hand pain 10/25/2014     DEISI (generalized anxiety disorder) 02/23/2016     Elevated troponin 12/03/2022     Dysthymia 09/23/2015     Degenerative disc disease, lumbar 01/05/2024     Decreased functional mobility and endurance 01/05/2024     Cough 05/17/2018     Hypertension 05/07/2024     Osteoarthritis of both hips 10/31/2023     Open wound of skin 04/09/2023     Obstructive sleep apnea 05/07/2024     Nevus of left foot 12/31/2015     Neck pain, chronic 04/18/2012     Chronic pain disorder 05/17/2018     Major depressive disorder, recurrent severe without psychotic features (H) 01/10/2023     Leukocytosis 12/03/2022     Lactic acidosis 04/29/2024     ICD (implantable cardioverter-defibrillator) in place 12/03/2022     Pre-syncope 12/03/2022     Paroxysmal atrial fibrillation (H) 10/31/2023     Atrial fibrillation with RVR (H) 04/29/2024     Pain in joint 10/25/2014     Osteopenia 07/16/2012     Osteoarthritis of both knees 06/18/2018     Type 2 diabetes mellitus with complication, without long-term current use of insulin (H) 09/16/2006     Tension type headache 03/04/2014     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     Sciatica, right side 05/11/2023     S/P gastric bypass 09/28/2012     Restless legs 04/19/2016     Vitamin D deficiency 09/19/2007     Ventricular tachycardia (H) 11/01/2022     Venous stasis dermatitis of both lower extremities 05/11/2023     COVID-19 12/01/2021     Low back pain 04/18/2012     MDD (major depressive disorder), recurrent episode, moderate (H) 02/23/2016     Primary osteoarthritis of both knees 06/18/2018     ANGELLA (acute kidney injury) (H24) 05/29/2024     Hyperkalemia 05/29/2024     Recurrent falls 05/29/2024     Supratherapeutic INR 05/29/2024     Hypotension 05/29/2024     Resolved Ambulatory Problems     Diagnosis Date Noted     No Resolved Ambulatory Problems     No Additional Past Medical  History     Allergies   Allergen Reactions     Aspirin Other (See Comments)     History of gastric bypass surgery     Cats Other (See Comments)     Runny nose     Cephalexin Other (See Comments)     Insomnia     Dust Mites Other (See Comments)     Runny nose     Ibuprofen Other (See Comments)     History of gastric bypass surgery     Nsaids Other (See Comments)     History of gastric bypass surgery       All Meds and Allergies reviewed in the record at the facility and is the most up-to-date.    Current Outpatient Medications   Medication Sig Dispense Refill     ferrous sulfate (FEROSUL) 325 (65 Fe) MG tablet Take 325 mg by mouth every 48 hours       acetaminophen (ACETAMINOPHEN 8 HOUR) 650 MG CR tablet Take 650 mg by mouth every 8 hours as needed for mild pain or fever       albuterol (PROAIR HFA/PROVENTIL HFA/VENTOLIN HFA) 108 (90 Base) MCG/ACT inhaler Inhale 2 puffs into the lungs 4 times daily as needed for shortness of breath, wheezing or cough       aspirin 81 MG EC tablet Take 162 mg by mouth daily       atorvastatin (LIPITOR) 80 MG tablet Take 80 mg by mouth daily       bisoprolol (ZEBETA) 5 MG tablet Take 5 mg by mouth daily       buPROPion (WELLBUTRIN) 75 MG tablet Take 120 mg by mouth 2 times daily       clonazePAM (KLONOPIN) 0.5 MG tablet Take 0.5 mg by mouth daily as needed for anxiety       diclofenac (VOLTAREN) 1 % topical gel Apply 2 g topically 4 times daily       DULoxetine (CYMBALTA) 30 MG capsule Take 30 mg by mouth daily       fexofenadine (ALLEGRA) 60 MG tablet Take 60 mg by mouth 2 times daily as needed for allergies       furosemide (LASIX) 40 MG tablet Take 20 mg by mouth daily as needed       gabapentin (NEURONTIN) 600 MG tablet Take 300-900 mg by mouth 3 times daily 300 mg PO every morning and afternoon; 900 mg every evening at bedtime       levothyroxine (SYNTHROID/LEVOTHROID) 25 MCG tablet Take 25 mcg by mouth three times a week       magnesium oxide (MAG-OX) 400 MG tablet Take 400 mg by  mouth daily       melatonin 3 MG tablet Take 3 mg by mouth nightly as needed for sleep       Multiple Vitamins-Minerals (MULTIVITAMIN ADULTS) TABS Take 1 tablet by mouth daily       nitroGLYcerin (NITROSTAT) 0.4 MG sublingual tablet Place 0.4 mg under the tongue every 5 minutes as needed for chest pain For chest pain place 1 tablet under the tongue every 5 minutes for 3 doses. If symptoms persist 5 minutes after 1st dose call 911.       traMADol HCl 25 MG TABS tablet Take 1 tablet (25 mg) by mouth every 6 hours as needed (pain) 30 tablet 0     traZODone (DESYREL) 50 MG tablet Take 50 mg by mouth nightly as needed for sleep       vitamin B-12 (CYANOCOBALAMIN) 1000 MCG tablet Take 1,000 mcg by mouth daily       vitamin D3 (CHOLECALCIFEROL) 1.25 MG (67618 UT) capsule Take 50,000 Units by mouth daily       No current facility-administered medications for this visit.       REVIEW OF SYSTEMS:   10 point review of systems reviewed and pertinent positives in the HPI.     PHYSICAL EXAMINATION:  Physical Exam     Vital signs: /67   Pulse 82   Temp 98.2  F (36.8  C)   Resp 16   Wt 108.8 kg (239 lb 12.8 oz)   SpO2 97%   BMI 37.00 kg/m    General: Awake, Alert, oriented x3, conversant.  HEENT:Pink conjunctiva, moist oral mucosa  NECK: Supple  CVS:  S1  S2, without murmur or gallop.   LUNG: Clear to auscultation, No wheezes, rales or rhonci.   BACK: No kyphosis of the thoracic spine  ABDOMEN: Soft, obese, non tender to palpation, with positive bowel sounds  EXTREMITIES: Moves both upper and lower extremities with generalized weakness, trace pedal edema, no calf tenderness.  Tenderness to right hip.  SKIN: Warm and dry  NEUROLOGIC: Intact, pulses palpable  PSYCHIATRIC: Pleasant affect.      Labs:  All labs reviewed in the nursing home record and Epic     Greater than 35 minutes spent preparing for this visit including reviewing hospital records, laboratory, medications, imaging as well as face-to-face time spent with  patient and collaboration with nursing staff.    This note has been dictated using voice recognition software. Any grammatical or context distortions are unintentional and inherent to the software    Electronically signed by: Nina Birch CNP       Sincerely,        Nina Birch NP

## 2024-06-03 NOTE — PROGRESS NOTES
Incoming fax from  [facility]    Date of result 6/3/2024    INR result 1.8      ANTICOAGULATION MANAGEMENT     Vikki Evans 63 year old female is on warfarin with subtherapeutic INR result. (Goal INR 2.0-3.0)    Recent labs: (last 7 days)     06/03/24  0000   INR 1.8*       INR 5/30:   2.7  INR 5/31:   2.3  INR 6/1:     2.1      ASSESSMENT     Source(s): Chart Review and Home Care/Facility Nurse     Warfarin doses taken: Less warfarin taken than planned which may be affecting INR, Hold for supratherapeutic INR  previous result, missed dose 6/1/24  Diet: No new diet changes identified  Medication/supplement changes:  Bactrim completed, Ferrous sulfate started 6/3/24, Furosemide changed to PRN , Bisopropol restarted, no interaction expected. Amiodarone stopped. Levothyroxine started  New illness, injury, or hospitalization: Yes: Hosp 5/28-6/1/24 for Recurrent falls, ortho static hypotension, Ablation 5/30/24   Signs or symptoms of bleeding or clotting: No  Previous result: Therapeutic last 2(+) visits  Additional findings:  ED discharge notes patient to take 2 mgs warfarin 6/1 and 6/2 and recheck INR 6/3       PLAN     Recommended plan for temporary change(s) affecting INR     Dosing Instructions: 6/3/24 Boost, then Increase your warfarin dose (20% change) with next INR in 3 days       Summary  As of 6/3/2024      Full warfarin instructions:  6/3: 3 mg; Otherwise 1 mg every Wed, Sat; 2 mg all other days   Next INR check:  6/6/2024               Telephone call with Nieves home care nurse who agrees to plan and repeated back plan correctly    Orders given to  Homecare nurse/facility to recheck    Education provided:   Please call back if any changes to your diet, medications or how you've been taking warfarin  Goal range and lab monitoring: goal range and significance of current result    Plan made with [facility] Pharmacist Nieves Max RN  Anticoagulation  Clinic  6/3/2024    _______________________________________________________________________     Anticoagulation Episode Summary       Current INR goal:  2.0-3.0   TTR:  59.1% (2.4 wk)   Target end date:  Indefinite   Send INR reminders to:  TAMMI MARQUES    Indications    Atrial fibrillation with RVR (H) [I48.91]             Comments:  A-Fib             Anticoagulation Care Providers       Provider Role Specialty Phone number    Nina Birch NP Referring Nurse Practitioner [phone]

## 2024-06-04 NOTE — PROGRESS NOTES
[facility] GERIATRIC SERVICES    Code Status:  FULL CODE   Visit Type:   Chief Complaint   Patient presents with    <9>TCU Readmit [employer]-5/28/2024-06/01/2024     Facility:  [facility] [place] [facility] ([facility]) [[id number]]         HPI: Vikki Evans is a 63 year old female who I am seeing today for re admit to  the TCU. Past medical history includes VT s/p ablation (2016), NSVT, paroxysmal atrial fibrillation/flutter, chronic HFrEF, CAD status post stents, MVR, TVR, hypertension, hyperlipidemia, NARESH, DM2, and depression. Pt rehospitalized on 5/28/2024 secondary to severe hypotension and acute kidney injury.  Patient with underlying A-fib with rapid ventricular response.  She underwent AV node ablation during recent hospitalization.  Her amiodarone was discontinued.  Upon admit to hospital an echocardiogram showed an ejection fracture of 15 to 20% which was at baseline.  Patient continued with orthostatic hypotension.  Cardiology was consulted and her cardiac medications were discontinued except for low-dose bisoprolol however this also was discontinued.  Recommendations were to wear pressure stockings and abdominal binder and use as needed midodrine if needed.  Her cortisol level was normal.  Chronic anemia secondary to history of gastric bypass.  Ferritin was elevated but iron stores were low.  She continues on iron supplementation.  Chronic anticoagulation with warfarin.       Transitional Care Course: Today pt sitting up in wheelchair.  She is more alert and talkative.  Patient status post AV node ablation.  Should have follow-up with cardiology today.  Surgical site intact.  Regular rate and rhythm noted on exam.  She reports having an EKG at her follow-up appointment however no records are returned.  She is voiding adequately.  She is complaining of some constipation.  She does continue on senna.  No shortness of breath or chest pain.  Chronic pain in her right hip secondary to DJD.  She continues on  tramadol.    Assessment/Plan:     Atrial fibrillation with RVR (H)  S/P ICD (internal cardiac defibrillator) procedure  Status post AV node ablation  -Amiodarone discontinued.  -No longer on beta-blocker.  -Continues on warfarin for anticoagulant.  -INR 1.8.  -Coumadin Clinic to dose INR.     Acute on chronic diastolic heart failure (H)  -Patient no longer on lisinopril, spironolactone or Lasix secondary to hypotension.  -every day weights.   -Follow up BMP.     Hypotension  -No longer on cardiac medications.  -Monitor BP.    Hypothyroidism  -medication induced from Amiodarone.   -TSH 15.60, T3 61, T4 0.81.   -Continues on levothyroxine 25 mcg 3/weekly.   -Will attempt to discontinue now that she is off her amiodarone.  -Follow-up TSH.      Low back pain with sciatica, sciatica laterality unspecified, unspecified back pain laterality, unspecified chronicity  Sciatica, right side  Right hip pain.  -Continue PTA gabapentin.   -Tramadol prn.   -Flexeril 5 mg TID.   -Pt followed out pt by Pain Team.   -Follow-up with Ortho outpatient.    Diabetes mellitus due to underlying condition with hyperosmolarity without coma, without long-term current use of insulin (H)  -diet controlled.     -Okay for PT OT eval and treat.    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    Cervical high risk HPV (human papillomavirus) test positive 05/11/2023    Bilateral lower extremity edema 05/11/2023    Anemia, unspecified 04/30/2013    Allergic rhinitis, mild 05/07/2024    Hyperparathyroidism (H24) 10/07/2012    Hyperlipidemia LDL goal <70 11/19/2011    History of sepsis 03/18/2024    History of nephrolithiasis 01/13/2013    Hand  pain 10/25/2014    DEISI (generalized anxiety disorder) 02/23/2016    Elevated troponin 12/03/2022    Dysthymia 09/23/2015    Degenerative disc disease, lumbar 01/05/2024    Decreased functional mobility and endurance 01/05/2024    Cough 05/17/2018    Hypertension 05/07/2024    Osteoarthritis of both hips 10/31/2023    Open wound of skin 04/09/2023    Obstructive sleep apnea 05/07/2024    Nevus of left foot 12/31/2015    Neck pain, chronic 04/18/2012    Chronic pain disorder 05/17/2018    Major depressive disorder, recurrent severe without psychotic features (H) 01/10/2023    Leukocytosis 12/03/2022    Lactic acidosis 04/29/2024    ICD (implantable cardioverter-defibrillator) in place 12/03/2022    Pre-syncope 12/03/2022    Paroxysmal atrial fibrillation (H) 10/31/2023    Atrial fibrillation with RVR (H) 04/29/2024    Pain in joint 10/25/2014    Osteopenia 07/16/2012    Osteoarthritis of both knees 06/18/2018    Type 2 diabetes mellitus with complication, without long-term current use of insulin (H) 09/16/2006    Tension type headache 03/04/2014    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    Sciatica, right side 05/11/2023    S/P gastric bypass 09/28/2012    Restless legs 04/19/2016    Vitamin D deficiency 09/19/2007    Ventricular tachycardia (H) 11/01/2022    Venous stasis dermatitis of both lower extremities 05/11/2023    COVID-19 12/01/2021    Low back pain 04/18/2012    MDD (major depressive disorder), recurrent episode, moderate (H) 02/23/2016    Primary osteoarthritis of both knees 06/18/2018    ANGELLA (acute kidney injury) (H24) 05/29/2024    Hyperkalemia 05/29/2024    Recurrent falls 05/29/2024    Supratherapeutic INR 05/29/2024    Hypotension 05/29/2024     Resolved Ambulatory Problems     Diagnosis Date Noted    No Resolved Ambulatory Problems     No Additional Past Medical History     Allergies   Allergen Reactions    Aspirin Other (See Comments)     History of gastric bypass surgery    Cats Other  (See Comments)     Runny nose    Cephalexin Other (See Comments)     Insomnia    Dust Mites Other (See Comments)     Runny nose    Ibuprofen Other (See Comments)     History of gastric bypass surgery    Nsaids Other (See Comments)     History of gastric bypass surgery       All Meds and Allergies reviewed in the record at the facility and is the most up-to-date.    Current Outpatient Medications   Medication Sig Dispense Refill    ferrous sulfate (FEROSUL) 325 (65 Fe) MG tablet Take 325 mg by mouth every 48 hours      acetaminophen (ACETAMINOPHEN 8 HOUR) 650 MG CR tablet Take 650 mg by mouth every 8 hours as needed for mild pain or fever      albuterol (PROAIR HFA/PROVENTIL HFA/VENTOLIN HFA) 108 (90 Base) MCG/ACT inhaler Inhale 2 puffs into the lungs 4 times daily as needed for shortness of breath, wheezing or cough      aspirin 81 MG EC tablet Take 162 mg by mouth daily      atorvastatin (LIPITOR) 80 MG tablet Take 80 mg by mouth daily      bisoprolol (ZEBETA) 5 MG tablet Take 5 mg by mouth daily      buPROPion (WELLBUTRIN) 75 MG tablet Take 120 mg by mouth 2 times daily      clonazePAM (KLONOPIN) 0.5 MG tablet Take 0.5 mg by mouth daily as needed for anxiety      diclofenac (VOLTAREN) 1 % topical gel Apply 2 g topically 4 times daily      DULoxetine (CYMBALTA) 30 MG capsule Take 30 mg by mouth daily      fexofenadine (ALLEGRA) 60 MG tablet Take 60 mg by mouth 2 times daily as needed for allergies      furosemide (LASIX) 40 MG tablet Take 20 mg by mouth daily as needed      gabapentin (NEURONTIN) 600 MG tablet Take 300-900 mg by mouth 3 times daily 300 mg PO every morning and afternoon; 900 mg every evening at bedtime      levothyroxine (SYNTHROID/LEVOTHROID) 25 MCG tablet Take 25 mcg by mouth three times a week      magnesium oxide (MAG-OX) 400 MG tablet Take 400 mg by mouth daily      melatonin 3 MG tablet Take 3 mg by mouth nightly as needed for sleep      Multiple Vitamins-Minerals (MULTIVITAMIN ADULTS) TABS  Take 1 tablet by mouth daily      nitroGLYcerin (NITROSTAT) 0.4 MG sublingual tablet Place 0.4 mg under the tongue every 5 minutes as needed for chest pain For chest pain place 1 tablet under the tongue every 5 minutes for 3 doses. If symptoms persist 5 minutes after 1st dose call 911.      traMADol HCl 25 MG TABS tablet Take 1 tablet (25 mg) by mouth every 6 hours as needed (pain) 30 tablet 0    traZODone (DESYREL) 50 MG tablet Take 50 mg by mouth nightly as needed for sleep      vitamin B-12 (CYANOCOBALAMIN) 1000 MCG tablet Take 1,000 mcg by mouth daily      vitamin D3 (CHOLECALCIFEROL) 1.25 MG (47481 UT) capsule Take 50,000 Units by mouth daily       No current facility-administered medications for this visit.       REVIEW OF SYSTEMS:   10 point review of systems reviewed and pertinent positives in the HPI.     PHYSICAL EXAMINATION:  Physical Exam     Vital signs: /67   Pulse 82   Temp 98.2  F (36.8  C)   Resp 16   Wt 108.8 kg (239 lb 12.8 oz)   SpO2 97%   BMI 37.00 kg/m    General: Awake, Alert, oriented x3, conversant.  HEENT:Pink conjunctiva, moist oral mucosa  NECK: Supple  CVS:  S1  S2, without murmur or gallop.   LUNG: Clear to auscultation, No wheezes, rales or rhonci.   BACK: No kyphosis of the thoracic spine  ABDOMEN: Soft, obese, non tender to palpation, with positive bowel sounds  EXTREMITIES: Moves both upper and lower extremities with generalized weakness, trace pedal edema, no calf tenderness.  Tenderness to right hip.  SKIN: Warm and dry  NEUROLOGIC: Intact, pulses palpable  PSYCHIATRIC: Pleasant affect.      Labs:  All labs reviewed in the nursing home record and Epic     Greater than 35 minutes spent preparing for this visit including reviewing hospital records, laboratory, medications, imaging as well as face-to-face time spent with patient and collaboration with nursing staff.    This note has been dictated using voice recognition software. Any grammatical or context distortions are  unintentional and inherent to the software    Electronically signed by: Nina Birch, CNP

## 2024-06-05 NOTE — LETTER
6/5/2024      Vikki Evans  [address] [facility] [address]         HEALTH GERIATRIC SERVICES    Code Status:  FULL CODE   Visit Type:   Chief Complaint   Patient presents with     Tcu Follow Up     Facility:  [facility] ([facility]) [[id number]]         HPI: Vikki Evans is a 63 year old female who I am seeing today for for follow-up on the TCU. Past medical history includes VT s/p ablation (2016), NSVT, paroxysmal atrial fibrillation/flutter, chronic HFrEF, CAD status post stents, MVR, TVR, hypertension, hyperlipidemia, NARESH, DM2, and depression. Pt rehospitalized on 5/28/2024 secondary to severe hypotension and acute kidney injury.  Patient with underlying A-fib with rapid ventricular response.  She underwent AV node ablation during recent hospitalization.  Her amiodarone was discontinued.  Upon admit to hospital an echocardiogram showed an ejection fracture of 15 to 20% which was at baseline.  Patient continued with orthostatic hypotension.  Cardiology was consulted and her cardiac medications were discontinued except for low-dose bisoprolol however this also was discontinued.  Recommendations were to wear pressure stockings and abdominal binder and use as needed midodrine if needed.  Her cortisol level was normal.  Chronic anemia secondary to history of gastric bypass.  Ferritin was elevated but iron stores were low.  She continues on iron supplementation.  Chronic anticoagulation with warfarin.       Transitional Care Course: Today pt sitting up in wheelchair.  Recent AV node ablation.  Patient denies any chest pain or palpitations.  No shortness of breath.  She is more alert.  No hallucinations.  She is improving in therapy.  She has chronic right hip pain and chronic low back pain with radiculopathy.  She continues on tramadol.  She is taking this 1-2 times daily.  Currently patient is on 25 mg she is asking for 25-50.  Some limitation noted to the right lower extremity with straight leg raise.   She is complaining of constipation.  Reports her last BM was during hospitalization.  She continues on senna.      Assessment/Plan:     Atrial fibrillation with RVR (H)  S/P ICD (internal cardiac defibrillator) procedure  Status post AV node ablation  -Amiodarone discontinued.  -No longer on beta-blocker.  -Continues on warfarin for anticoagulant.  -INR 1.8.  -Coumadin Clinic to dose INR.     Acute on chronic diastolic heart failure (H)  -Patient no longer on lisinopril, spironolactone or Lasix secondary to hypotension.  -every day weights.   -Follow up BMP yesterday with creatinine 1.14 and sodium 134.  Monitor.    Hypotension  -No longer on cardiac medications.  -BP satisfactory.    Hypothyroidism  -medication induced from Amiodarone.   -TSH 15.60, T3 61, T4 0.81.   -Continues on levothyroxine 25 mcg 3/weekly.   -Will attempt to discontinue now that she is off her amiodarone.  -Follow-up TSH on Monday.      Low back pain with sciatica, sciatica laterality unspecified, unspecified back pain laterality, unspecified chronicity  Sciatica, right side  Right hip pain.  -Continue PTA gabapentin.   -Increase tramadol to 25 to 50 mg p.o. every 8 hours as needed.  -Flexeril 5 mg TID.   -Pt followed out pt by Pain Team.   -Follow-up with Ortho outpatient.    Diabetes mellitus due to underlying condition with hyperosmolarity without coma, without long-term current use of insulin (H)  -diet controlled.     Constipation  -Continue senna S 2 tabs twice daily.  -MiraLAX 17 g daily.      Active Ambulatory Problems     Diagnosis Date Noted     Acute on chronic HFrEF (heart failure with reduced ejection fraction) (H) 04/29/2024     Chronic systolic heart failure (H) 08/06/2012     Coronary atherosclerosis 05/07/2024     Contusion of shoulder 10/07/2016     Continuous severe abdominal pain 12/31/2015     Circadian rhythm sleep disorder 07/06/2017     Cervical high risk HPV (human papillomavirus) test positive 05/11/2023     Bilateral  lower extremity edema 05/11/2023     Anemia, unspecified 04/30/2013     Allergic rhinitis, mild 05/07/2024     Hyperparathyroidism (H24) 10/07/2012     Hyperlipidemia LDL goal <70 11/19/2011     History of sepsis 03/18/2024     History of nephrolithiasis 01/13/2013     Hand pain 10/25/2014     DEISI (generalized anxiety disorder) 02/23/2016     Elevated troponin 12/03/2022     Dysthymia 09/23/2015     Degenerative disc disease, lumbar 01/05/2024     Decreased functional mobility and endurance 01/05/2024     Cough 05/17/2018     Hypertension 05/07/2024     Osteoarthritis of both hips 10/31/2023     Open wound of skin 04/09/2023     Obstructive sleep apnea 05/07/2024     Nevus of left foot 12/31/2015     Neck pain, chronic 04/18/2012     Chronic pain disorder 05/17/2018     Major depressive disorder, recurrent severe without psychotic features (H) 01/10/2023     Leukocytosis 12/03/2022     Lactic acidosis 04/29/2024     ICD (implantable cardioverter-defibrillator) in place 12/03/2022     Pre-syncope 12/03/2022     Paroxysmal atrial fibrillation (H) 10/31/2023     Atrial fibrillation with RVR (H) 04/29/2024     Pain in joint 10/25/2014     Osteopenia 07/16/2012     Osteoarthritis of both knees 06/18/2018     Type 2 diabetes mellitus with complication, without long-term current use of insulin (H) 09/16/2006     Tension type headache 03/04/2014     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     Sciatica, right side 05/11/2023     S/P gastric bypass 09/28/2012     Restless legs 04/19/2016     Vitamin D deficiency 09/19/2007     Ventricular tachycardia (H) 11/01/2022     Venous stasis dermatitis of both lower extremities 05/11/2023     COVID-19 12/01/2021     Low back pain 04/18/2012     MDD (major depressive disorder), recurrent episode, moderate (H) 02/23/2016     Primary osteoarthritis of both knees 06/18/2018     ANGELLA (acute kidney injury) (H24) 05/29/2024     Hyperkalemia 05/29/2024     Recurrent falls 05/29/2024      Supratherapeutic INR 05/29/2024     Hypotension 05/29/2024     Resolved Ambulatory Problems     Diagnosis Date Noted     No Resolved Ambulatory Problems     No Additional Past Medical History     Allergies   Allergen Reactions     Aspirin Other (See Comments)     History of gastric bypass surgery     Cats Other (See Comments)     Runny nose     Cephalexin Other (See Comments)     Insomnia     Dust Mites Other (See Comments)     Runny nose     Ibuprofen Other (See Comments)     History of gastric bypass surgery     Nsaids Other (See Comments)     History of gastric bypass surgery       All Meds and Allergies reviewed in the record at the facility and is the most up-to-date.    Current Outpatient Medications   Medication Sig Dispense Refill     acetaminophen (ACETAMINOPHEN 8 HOUR) 650 MG CR tablet Take 650 mg by mouth every 8 hours as needed for mild pain or fever       albuterol (PROAIR HFA/PROVENTIL HFA/VENTOLIN HFA) 108 (90 Base) MCG/ACT inhaler Inhale 2 puffs into the lungs 4 times daily as needed for shortness of breath, wheezing or cough       aspirin 81 MG EC tablet Take 162 mg by mouth daily       atorvastatin (LIPITOR) 80 MG tablet Take 80 mg by mouth daily       bisoprolol (ZEBETA) 5 MG tablet Take 5 mg by mouth daily       buPROPion (WELLBUTRIN) 75 MG tablet Take 120 mg by mouth 2 times daily       clonazePAM (KLONOPIN) 0.5 MG tablet Take 0.5 mg by mouth daily as needed for anxiety       diclofenac (VOLTAREN) 1 % topical gel Apply 2 g topically 4 times daily       DULoxetine (CYMBALTA) 30 MG capsule Take 30 mg by mouth daily       ferrous sulfate (FEROSUL) 325 (65 Fe) MG tablet Take 325 mg by mouth every 48 hours       fexofenadine (ALLEGRA) 60 MG tablet Take 60 mg by mouth 2 times daily as needed for allergies       furosemide (LASIX) 40 MG tablet Take 20 mg by mouth daily as needed       gabapentin (NEURONTIN) 600 MG tablet Take 300-900 mg by mouth 3 times daily 300 mg PO every morning and afternoon;  900 mg every evening at bedtime       levothyroxine (SYNTHROID/LEVOTHROID) 25 MCG tablet Take 25 mcg by mouth three times a week       magnesium oxide (MAG-OX) 400 MG tablet Take 400 mg by mouth daily       melatonin 3 MG tablet Take 3 mg by mouth nightly as needed for sleep       Multiple Vitamins-Minerals (MULTIVITAMIN ADULTS) TABS Take 1 tablet by mouth daily       nitroGLYcerin (NITROSTAT) 0.4 MG sublingual tablet Place 0.4 mg under the tongue every 5 minutes as needed for chest pain For chest pain place 1 tablet under the tongue every 5 minutes for 3 doses. If symptoms persist 5 minutes after 1st dose call 911.       traMADol HCl 25 MG TABS tablet Take 1 tablet (25 mg) by mouth every 6 hours as needed (pain) (Patient taking differently: Take 25 mg by mouth every 8 hours as needed (pain) 25-50 mg Q 8 hours prn.) 30 tablet 0     traZODone (DESYREL) 50 MG tablet Take 50 mg by mouth nightly as needed for sleep       vitamin B-12 (CYANOCOBALAMIN) 1000 MCG tablet Take 1,000 mcg by mouth daily       vitamin D3 (CHOLECALCIFEROL) 1.25 MG (81885 UT) capsule Take 50,000 Units by mouth daily       No current facility-administered medications for this visit.       REVIEW OF SYSTEMS:   10 point review of systems reviewed and pertinent positives in the HPI.     PHYSICAL EXAMINATION:  Physical Exam     Vital signs: /68   Pulse 100   Temp 97.6  F (36.4  C)   Resp 16   Wt 109 kg (240 lb 3.2 oz)   SpO2 90%   BMI 37.07 kg/m    General: Awake, Alert, oriented x3, conversant.  HEENT:Pink conjunctiva, moist oral mucosa  NECK: Supple  CVS:  S1  S2, without murmur or gallop.   LUNG: Clear to auscultation, No wheezes, rales or rhonci.   BACK: No kyphosis of the thoracic spine  ABDOMEN: Soft, obese, non tender to palpation, with positive bowel sounds  EXTREMITIES: Moves both upper and lower extremities with generalized weakness, trace pedal edema, no calf tenderness.  Tenderness to right hip.  Difficulty with straight leg  raise on the right.  SKIN: Warm and dry  NEUROLOGIC: Intact, pulses palpable  PSYCHIATRIC: Pleasant affect.      Labs:  All labs reviewed in the nursing home record and Epic     This note has been dictated using voice recognition software. Any grammatical or context distortions are unintentional and inherent to the software    Electronically signed by: Nina Birch CNP       Sincerely,        Nina Birch NP

## 2024-06-06 NOTE — PROGRESS NOTES
ANTICOAGULATION MANAGEMENT     Vikki Evans 63 year old female is on warfarin with therapeutic INR result. (Goal INR 2.0-3.0)    Recent labs: (last 7 days)     06/06/24  0000   INR 2.7       ASSESSMENT     Source(s): Chart Review and Home Care/Facility Nurse - Nieves    Warfarin doses taken: Warfarin taken as instructed  Diet: No new diet changes identified  Medication/supplement changes:  asa 162 mg daily - not new.  Amiodarone stopped on 6/3/24  New illness, injury, or hospitalization: No  Signs or symptoms of bleeding or clotting: No  Previous result: Subtherapeutic  Additional findings: None       PLAN     Recommended plan for no diet, medication or health factor changes affecting INR     Dosing Instructions: Continue your current warfarin dose with next INR in 4 days       Summary  As of 6/6/2024      Full warfarin instructions:  1 mg every Wed, Sat; 2 mg all other days   Next INR check:  6/10/2024               Telephone call with Nieves [facility] nurse (medical care for seniors) who agrees to plan and repeated back plan correctly    Orders given to  Homecare nurse/facility to recheck    Education provided:   Please call back if any changes to your diet, medications or how you've been taking warfarin    Plan made per [facility] anticoagulation protocol    Vernell Skinner RN  Anticoagulation Clinic  6/6/2024    _______________________________________________________________________     Anticoagulation Episode Summary       Current INR goal:  2.0-3.0   TTR:  61.9% (2.9 wk)   Target end date:  Indefinite   Send INR reminders to:  [facility] (TCU/LTC/[facility])    Indications    Atrial fibrillation with RVR (H) [I48.91]             Comments:  A-Fib             Anticoagulation Care Providers       Provider Role Specialty Phone number    Nina Birch NP Referring Nurse Practitioner [phone]

## 2024-06-06 NOTE — PROGRESS NOTES
Incoming fax from [facility] [place]    Date of result: 06/06/2024    INR result: 2.7    Provider: Eileen ROSEN/Queta NP    Template answers: negative except  mg daily    Dose taken since last INR:  unable to read    Patricia Vasquez RN    [facility]

## 2024-06-06 NOTE — PROGRESS NOTES
[facility]    Code Status:  FULL CODE   Visit Type:   Chief Complaint   Patient presents with    Tcu Follow Up     Facility:  [facility] ([facility]) [[id number]]         HPI: Vikki Evans is a 63 year old female who I am seeing today for for follow-up on the TCU. Past medical history includes VT s/p ablation (2016), NSVT, paroxysmal atrial fibrillation/flutter, chronic HFrEF, CAD status post stents, MVR, TVR, hypertension, hyperlipidemia, NARESH, DM2, and depression. Pt rehospitalized on 5/28/2024 secondary to severe hypotension and acute kidney injury.  Patient with underlying A-fib with rapid ventricular response.  She underwent AV node ablation during recent hospitalization.  Her amiodarone was discontinued.  Upon admit to hospital an echocardiogram showed an ejection fracture of 15 to 20% which was at baseline.  Patient continued with orthostatic hypotension.  Cardiology was consulted and her cardiac medications were discontinued except for low-dose bisoprolol however this also was discontinued.  Recommendations were to wear pressure stockings and abdominal binder and use as needed midodrine if needed.  Her cortisol level was normal.  Chronic anemia secondary to history of gastric bypass.  Ferritin was elevated but iron stores were low.  She continues on iron supplementation.  Chronic anticoagulation with warfarin.       Transitional Care Course: Today pt sitting up in wheelchair.  Recent AV node ablation.  Patient denies any chest pain or palpitations.  No shortness of breath.  She is more alert.  No hallucinations.  She is improving in therapy.  She has chronic right hip pain and chronic low back pain with radiculopathy.  She continues on tramadol.  She is taking this 1-2 times daily.  Currently patient is on 25 mg she is asking for 25-50.  Some limitation noted to the right lower extremity with straight leg raise.  She is complaining of constipation.  Reports her last BM was during  hospitalization.  She continues on senna.      Assessment/Plan:     Atrial fibrillation with RVR (H)  S/P ICD (internal cardiac defibrillator) procedure  Status post AV node ablation  -Amiodarone discontinued.  -No longer on beta-blocker.  -Continues on warfarin for anticoagulant.  -INR 1.8.  -Coumadin Clinic to dose INR.     Acute on chronic diastolic heart failure (H)  -Patient no longer on lisinopril, spironolactone or Lasix secondary to hypotension.  -every day weights.   -Follow up BMP yesterday with creatinine 1.14 and sodium 134.  Monitor.    Hypotension  -No longer on cardiac medications.  -BP satisfactory.    Hypothyroidism  -medication induced from Amiodarone.   -TSH 15.60, T3 61, T4 0.81.   -Continues on levothyroxine 25 mcg 3/weekly.   -Will attempt to discontinue now that she is off her amiodarone.  -Follow-up TSH on Monday.      Low back pain with sciatica, sciatica laterality unspecified, unspecified back pain laterality, unspecified chronicity  Sciatica, right side  Right hip pain.  -Continue PTA gabapentin.   -Increase tramadol to 25 to 50 mg p.o. every 8 hours as needed.  -Flexeril 5 mg TID.   -Pt followed out pt by Pain Team.   -Follow-up with Ortho outpatient.    Diabetes mellitus due to underlying condition with hyperosmolarity without coma, without long-term current use of insulin (H)  -diet controlled.     Constipation  -Continue senna S 2 tabs twice daily.  -MiraLAX 17 g daily.      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    Cervical high risk HPV (human papillomavirus) test positive 05/11/2023    Bilateral lower extremity edema 05/11/2023    Anemia, unspecified 04/30/2013     Allergic rhinitis, mild 05/07/2024    Hyperparathyroidism (H24) 10/07/2012    Hyperlipidemia LDL goal <70 11/19/2011    History of sepsis 03/18/2024    History of nephrolithiasis 01/13/2013    Hand pain 10/25/2014    DEISI (generalized anxiety disorder) 02/23/2016    Elevated troponin 12/03/2022    Dysthymia 09/23/2015    Degenerative disc disease, lumbar 01/05/2024    Decreased functional mobility and endurance 01/05/2024    Cough 05/17/2018    Hypertension 05/07/2024    Osteoarthritis of both hips 10/31/2023    Open wound of skin 04/09/2023    Obstructive sleep apnea 05/07/2024    Nevus of left foot 12/31/2015    Neck pain, chronic 04/18/2012    Chronic pain disorder 05/17/2018    Major depressive disorder, recurrent severe without psychotic features (H) 01/10/2023    Leukocytosis 12/03/2022    Lactic acidosis 04/29/2024    ICD (implantable cardioverter-defibrillator) in place 12/03/2022    Pre-syncope 12/03/2022    Paroxysmal atrial fibrillation (H) 10/31/2023    Atrial fibrillation with RVR (H) 04/29/2024    Pain in joint 10/25/2014    Osteopenia 07/16/2012    Osteoarthritis of both knees 06/18/2018    Type 2 diabetes mellitus with complication, without long-term current use of insulin (H) 09/16/2006    Tension type headache 03/04/2014    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    Sciatica, right side 05/11/2023    S/P gastric bypass 09/28/2012    Restless legs 04/19/2016    Vitamin D deficiency 09/19/2007    Ventricular tachycardia (H) 11/01/2022    Venous stasis dermatitis of both lower extremities 05/11/2023    COVID-19 12/01/2021    Low back pain 04/18/2012    MDD (major depressive disorder), recurrent episode, moderate (H) 02/23/2016    Primary osteoarthritis of both knees 06/18/2018    ANGELLA (acute kidney injury) (H24) 05/29/2024    Hyperkalemia 05/29/2024    Recurrent falls 05/29/2024    Supratherapeutic INR 05/29/2024    Hypotension 05/29/2024     Resolved Ambulatory Problems     Diagnosis Date  Noted    No Resolved Ambulatory Problems     No Additional Past Medical History     Allergies   Allergen Reactions    Aspirin Other (See Comments)     History of gastric bypass surgery    Cats Other (See Comments)     Runny nose    Cephalexin Other (See Comments)     Insomnia    Dust Mites Other (See Comments)     Runny nose    Ibuprofen Other (See Comments)     History of gastric bypass surgery    Nsaids Other (See Comments)     History of gastric bypass surgery       All Meds and Allergies reviewed in the record at the facility and is the most up-to-date.    Current Outpatient Medications   Medication Sig Dispense Refill    acetaminophen (ACETAMINOPHEN 8 HOUR) 650 MG CR tablet Take 650 mg by mouth every 8 hours as needed for mild pain or fever      albuterol (PROAIR HFA/PROVENTIL HFA/VENTOLIN HFA) 108 (90 Base) MCG/ACT inhaler Inhale 2 puffs into the lungs 4 times daily as needed for shortness of breath, wheezing or cough      aspirin 81 MG EC tablet Take 162 mg by mouth daily      atorvastatin (LIPITOR) 80 MG tablet Take 80 mg by mouth daily      bisoprolol (ZEBETA) 5 MG tablet Take 5 mg by mouth daily      buPROPion (WELLBUTRIN) 75 MG tablet Take 120 mg by mouth 2 times daily      clonazePAM (KLONOPIN) 0.5 MG tablet Take 0.5 mg by mouth daily as needed for anxiety      diclofenac (VOLTAREN) 1 % topical gel Apply 2 g topically 4 times daily      DULoxetine (CYMBALTA) 30 MG capsule Take 30 mg by mouth daily      ferrous sulfate (FEROSUL) 325 (65 Fe) MG tablet Take 325 mg by mouth every 48 hours      fexofenadine (ALLEGRA) 60 MG tablet Take 60 mg by mouth 2 times daily as needed for allergies      furosemide (LASIX) 40 MG tablet Take 20 mg by mouth daily as needed      gabapentin (NEURONTIN) 600 MG tablet Take 300-900 mg by mouth 3 times daily 300 mg PO every morning and afternoon; 900 mg every evening at bedtime      levothyroxine (SYNTHROID/LEVOTHROID) 25 MCG tablet Take 25 mcg by mouth three times a week       magnesium oxide (MAG-OX) 400 MG tablet Take 400 mg by mouth daily      melatonin 3 MG tablet Take 3 mg by mouth nightly as needed for sleep      Multiple Vitamins-Minerals (MULTIVITAMIN ADULTS) TABS Take 1 tablet by mouth daily      nitroGLYcerin (NITROSTAT) 0.4 MG sublingual tablet Place 0.4 mg under the tongue every 5 minutes as needed for chest pain For chest pain place 1 tablet under the tongue every 5 minutes for 3 doses. If symptoms persist 5 minutes after 1st dose call 911.      traMADol HCl 25 MG TABS tablet Take 1 tablet (25 mg) by mouth every 6 hours as needed (pain) (Patient taking differently: Take 25 mg by mouth every 8 hours as needed (pain) 25-50 mg Q 8 hours prn.) 30 tablet 0    traZODone (DESYREL) 50 MG tablet Take 50 mg by mouth nightly as needed for sleep      vitamin B-12 (CYANOCOBALAMIN) 1000 MCG tablet Take 1,000 mcg by mouth daily      vitamin D3 (CHOLECALCIFEROL) 1.25 MG (23981 UT) capsule Take 50,000 Units by mouth daily       No current facility-administered medications for this visit.       REVIEW OF SYSTEMS:   10 point review of systems reviewed and pertinent positives in the HPI.     PHYSICAL EXAMINATION:  Physical Exam     Vital signs: /68   Pulse 100   Temp 97.6  F (36.4  C)   Resp 16   Wt 109 kg (240 lb 3.2 oz)   SpO2 90%   BMI 37.07 kg/m    General: Awake, Alert, oriented x3, conversant.  HEENT:Pink conjunctiva, moist oral mucosa  NECK: Supple  CVS:  S1  S2, without murmur or gallop.   LUNG: Clear to auscultation, No wheezes, rales or rhonci.   BACK: No kyphosis of the thoracic spine  ABDOMEN: Soft, obese, non tender to palpation, with positive bowel sounds  EXTREMITIES: Moves both upper and lower extremities with generalized weakness, trace pedal edema, no calf tenderness.  Tenderness to right hip.  Difficulty with straight leg raise on the right.  SKIN: Warm and dry  NEUROLOGIC: Intact, pulses palpable  PSYCHIATRIC: Pleasant affect.      Labs:  All labs reviewed in the  nursing home record and Epic     This note has been dictated using voice recognition software. Any grammatical or context distortions are unintentional and inherent to the software    Electronically signed by: Nina Birch CNP

## 2024-06-10 NOTE — PROGRESS NOTES
ANTICOAGULATION MANAGEMENT     Vikki Evans 63 year old female is on warfarin with supratherapeutic INR result. (Goal INR 2.0-3.0)    Recent labs: (last 7 days)     06/10/24  1639   INR 4.0*       ASSESSMENT     Source(s): Chart Review and Home Care/Facility Nurse     Warfarin doses taken: Warfarin taken as instructed  Diet: No new diet changes identified  Medication/supplement changes: None noted  New illness, injury, or hospitalization: No  Signs or symptoms of bleeding or clotting: No  Previous result: Therapeutic last visit; previously outside of goal range  Additional findings: None       PLAN     Recommended plan for no diet, medication or health factor changes affecting INR     Dosing Instructions: hold dose then decrease your warfarin dose (16.7% change) with next INR in 4 days       Summary  As of 6/10/2024      Full warfarin instructions:  6/10: Hold; Otherwise 2 mg every Sun, Tue, Thu; 1 mg all other days   Next INR check:  6/14/2024               Telephone call with Nieves PITT [facility] nurse (medical care for seniors) who agrees to plan and repeated back plan correctly    Orders given to  Homecare nurse/facility to recheck    Education provided:   None required    Plan made per [facility] anticoagulation protocol and per [facility] anticoagulation protocol; closest % change with current tablet size made per protocol for for INR 4-4.9 (goal 2-3)    Patricia Vasquez RN  Anticoagulation Clinic  6/10/2024    _______________________________________________________________________     Anticoagulation Episode Summary       Current INR goal:  2.0-3.0   TTR:  54.5% (3.4 wk)   Target end date:  Indefinite   Send INR reminders to:  [facility] (U/LTC/[facility])    Indications    Atrial fibrillation with RVR (H) [I48.91]             Comments:  A-Fib             Anticoagulation Care Providers       Provider Role Specialty Phone number    Nina Birch NP Referring Nurse Practitioner  [phone]

## 2024-06-11 NOTE — LETTER
6/11/2024      Vikki Evans  [address] [address]         HEALTH GERIATRIC SERVICES    Code Status:  FULL CODE   Visit Type:   Chief Complaint   Patient presents with     TCU FOLLOW UP     Facility:  [facility] () [[id number]]         HPI: Vikki Evans is a 63 year old female who I am seeing today for for follow-up on the TCU. Past medical history includes VT s/p ablation (2016), NSVT, paroxysmal atrial fibrillation/flutter, chronic HFrEF, CAD status post stents, MVR, TVR, hypertension, hyperlipidemia, NARESH, DM2, and depression. Pt rehospitalized on 5/28/2024 secondary to severe hypotension and acute kidney injury.  Patient with underlying A-fib with rapid ventricular response.  She underwent AV node ablation during recent hospitalization.  Her amiodarone was discontinued.  Upon admit to hospital an echocardiogram showed an ejection fracture of 15 to 20% which was at baseline.  Patient continued with orthostatic hypotension.  Cardiology was consulted and her cardiac medications were discontinued except for low-dose bisoprolol however this also was discontinued.  Recommendations were to wear pressure stockings and abdominal binder and use as needed midodrine if needed.  Her cortisol level was normal.  Chronic anemia secondary to history of gastric bypass.  Ferritin was elevated but iron stores were low.  She continues on iron supplementation.  Chronic anticoagulation with warfarin.       Transitional Care Course: Today pt sitting up in wheelchair. Pt had a fall self transferring. She denies hitting her head or LOC. She is reporting more pain in her right hip. Chronic low back with radiculopathy in her right hip. She continues on tramadol. Recent AV node ablation. No SOB or CP. Constipation improved.     Assessment/Plan:     Atrial fibrillation with RVR (H)  S/P ICD (internal cardiac defibrillator) procedure  Status post AV node ablation  -Amiodarone discontinued.  -No longer on  beta-blocker.  -Continues on warfarin for anticoagulant.  -INR 4.0.   -Coumadin Clinic to dose INR.     Acute on chronic diastolic heart failure (H)  -Patient no longer on lisinopril, spironolactone or Lasix secondary to hypotension.  -every day weights.   -Creatine 1.14 and sodium 134.  Monitor.  -No SOB or CP.     Hypothyroidism  -medication induced from Amiodarone.   -TSH 15.60, T3 61, T4 0.81.   -Continues on levothyroxine 25 mcg 3/weekly.   -Will attempt to discontinue now that she is off her amiodarone.  -Follow-up TSH now 11.60.       Low back pain with sciatica, sciatica laterality unspecified, unspecified back pain laterality, unspecified chronicity  Sciatica, right side  Right hip pain  Fall   -Continue PTA gabapentin.   -Increase tramadol to 25 to 50 mg p.o. every 8 hours as needed.  -Flexeril 5 mg TID.   -Pt followed out pt by Pain Team.   -Follow-up with Ortho outpatient.  -No LOC. Pt denies hitting her head.     Diabetes mellitus due to underlying condition with hyperosmolarity without coma, without long-term current use of insulin (H)  -diet controlled.     Constipation  -Continue senna S 2 tabs twice daily.  -MiraLAX 17 g daily.      Active Ambulatory Problems     Diagnosis Date Noted     Acute on chronic HFrEF (heart failure with reduced ejection fraction) (H) 04/29/2024     Chronic systolic heart failure (H) 08/06/2012     Coronary atherosclerosis 05/07/2024     Contusion of shoulder 10/07/2016     Continuous severe abdominal pain 12/31/2015     Circadian rhythm sleep disorder 07/06/2017     Cervical high risk HPV (human papillomavirus) test positive 05/11/2023     Bilateral lower extremity edema 05/11/2023     Anemia, unspecified 04/30/2013     Allergic rhinitis, mild 05/07/2024     Hyperparathyroidism (H24) 10/07/2012     Hyperlipidemia LDL goal <70 11/19/2011     History of sepsis 03/18/2024     History of nephrolithiasis 01/13/2013     Hand pain 10/25/2014     DEISI (generalized anxiety disorder)  02/23/2016     Elevated troponin 12/03/2022     Dysthymia 09/23/2015     Degenerative disc disease, lumbar 01/05/2024     Decreased functional mobility and endurance 01/05/2024     Cough 05/17/2018     Hypertension 05/07/2024     Osteoarthritis of both hips 10/31/2023     Open wound of skin 04/09/2023     Obstructive sleep apnea 05/07/2024     Nevus of left foot 12/31/2015     Neck pain, chronic 04/18/2012     Chronic pain disorder 05/17/2018     Major depressive disorder, recurrent severe without psychotic features (H) 01/10/2023     Leukocytosis 12/03/2022     Lactic acidosis 04/29/2024     ICD (implantable cardioverter-defibrillator) in place 12/03/2022     Pre-syncope 12/03/2022     Paroxysmal atrial fibrillation (H) 10/31/2023     Atrial fibrillation with RVR (H) 04/29/2024     Pain in joint 10/25/2014     Osteopenia 07/16/2012     Osteoarthritis of both knees 06/18/2018     Type 2 diabetes mellitus with complication, without long-term current use of insulin (H) 09/16/2006     Tension type headache 03/04/2014     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     Sciatica, right side 05/11/2023     S/P gastric bypass 09/28/2012     Restless legs 04/19/2016     Vitamin D deficiency 09/19/2007     Ventricular tachycardia (H) 11/01/2022     Venous stasis dermatitis of both lower extremities 05/11/2023     COVID-19 12/01/2021     Low back pain 04/18/2012     MDD (major depressive disorder), recurrent episode, moderate (H) 02/23/2016     Primary osteoarthritis of both knees 06/18/2018     ANGELLA (acute kidney injury) (H24) 05/29/2024     Hyperkalemia 05/29/2024     Recurrent falls 05/29/2024     Supratherapeutic INR 05/29/2024     Hypotension 05/29/2024     Resolved Ambulatory Problems     Diagnosis Date Noted     No Resolved Ambulatory Problems     No Additional Past Medical History     Allergies   Allergen Reactions     Aspirin Other (See Comments)     History of gastric bypass surgery     Cats Other (See  Comments)     Runny nose     Cephalexin Other (See Comments)     Insomnia     Dust Mites Other (See Comments)     Runny nose     Ibuprofen Other (See Comments)     History of gastric bypass surgery     Nsaids Other (See Comments)     History of gastric bypass surgery       All Meds and Allergies reviewed in the record at the facility and is the most up-to-date.    Current Outpatient Medications   Medication Sig Dispense Refill     acetaminophen (ACETAMINOPHEN 8 HOUR) 650 MG CR tablet Take 650 mg by mouth every 8 hours as needed for mild pain or fever       albuterol (PROAIR HFA/PROVENTIL HFA/VENTOLIN HFA) 108 (90 Base) MCG/ACT inhaler Inhale 2 puffs into the lungs 4 times daily as needed for shortness of breath, wheezing or cough       aspirin 81 MG EC tablet Take 162 mg by mouth daily       atorvastatin (LIPITOR) 80 MG tablet Take 80 mg by mouth daily       bisoprolol (ZEBETA) 5 MG tablet Take 5 mg by mouth daily       buPROPion (WELLBUTRIN) 75 MG tablet Take 120 mg by mouth 2 times daily       clonazePAM (KLONOPIN) 0.5 MG tablet Take 0.5 mg by mouth daily as needed for anxiety       diclofenac (VOLTAREN) 1 % topical gel Apply 2 g topically 4 times daily       DULoxetine (CYMBALTA) 30 MG capsule Take 30 mg by mouth daily       ferrous sulfate (FEROSUL) 325 (65 Fe) MG tablet Take 325 mg by mouth every 48 hours       fexofenadine (ALLEGRA) 60 MG tablet Take 60 mg by mouth 2 times daily as needed for allergies       furosemide (LASIX) 40 MG tablet Take 20 mg by mouth daily as needed       gabapentin (NEURONTIN) 600 MG tablet Take 300-900 mg by mouth 3 times daily 300 mg PO every morning and afternoon; 900 mg every evening at bedtime       levothyroxine (SYNTHROID/LEVOTHROID) 25 MCG tablet Take 25 mcg by mouth three times a week       magnesium oxide (MAG-OX) 400 MG tablet Take 400 mg by mouth daily       melatonin 3 MG tablet Take 3 mg by mouth nightly as needed for sleep       Multiple Vitamins-Minerals (MULTIVITAMIN  "ADULTS) TABS Take 1 tablet by mouth daily       nitroGLYcerin (NITROSTAT) 0.4 MG sublingual tablet Place 0.4 mg under the tongue every 5 minutes as needed for chest pain For chest pain place 1 tablet under the tongue every 5 minutes for 3 doses. If symptoms persist 5 minutes after 1st dose call 911.       traMADol (ULTRAM) 50 MG tablet 25-50mg by mouth every 8 hours as needed.  Pain rated 1-5 = 25mg and pain rated 6-10 = 50mg 16 tablet 1     traZODone (DESYREL) 50 MG tablet Take 50 mg by mouth nightly as needed for sleep       vitamin B-12 (CYANOCOBALAMIN) 1000 MCG tablet Take 1,000 mcg by mouth daily       vitamin D3 (CHOLECALCIFEROL) 1.25 MG (55196 UT) capsule Take 50,000 Units by mouth daily       No current facility-administered medications for this visit.       REVIEW OF SYSTEMS:   10 point review of systems reviewed and pertinent positives in the HPI.     PHYSICAL EXAMINATION:  Physical Exam     Vital signs: /68   Pulse 80   Temp 97.2  F (36.2  C)   Resp 19   Ht 1.715 m (5' 7.5\")   Wt 110.7 kg (244 lb)   SpO2 100%   BMI 37.65 kg/m    General: Awake, Alert, oriented x3, conversant.  HEENT:Pink conjunctiva, moist oral mucosa  NECK: Supple  CVS:  S1  S2, without murmur or gallop.   LUNG: Clear to auscultation, No wheezes, rales or rhonci.   BACK: No kyphosis of the thoracic spine  ABDOMEN: Soft, obese, non tender to palpation, with positive bowel sounds  EXTREMITIES: Moves both upper and lower extremities with generalized weakness, trace pedal edema, no calf tenderness.   SKIN: Warm and dry  NEUROLOGIC: Intact, pulses palpable  PSYCHIATRIC: Pleasant affect.      Labs:  All labs reviewed in the nursing home record and [facility]     This note has been dictated using voice recognition software. Any grammatical or context distortions are unintentional and inherent to the software    Electronically signed by: Nina Birch CNP       Sincerely,        Nina Birch NP      "

## 2024-06-12 NOTE — PROGRESS NOTES
[facility] GERIATRIC SERVICES    Code Status:  FULL CODE   Visit Type:   Chief Complaint   Patient presents with    TCU FOLLOW UP     Facility:  [facility] ([facility]) [[id number]]         HPI: Vikki Evans is a 63 year old female who I am seeing today for for follow-up on the TCU. Past medical history includes VT s/p ablation (2016), NSVT, paroxysmal atrial fibrillation/flutter, chronic HFrEF, CAD status post stents, MVR, TVR, hypertension, hyperlipidemia, NARESH, DM2, and depression. Pt rehospitalized on 5/28/2024 secondary to severe hypotension and acute kidney injury.  Patient with underlying A-fib with rapid ventricular response.  She underwent AV node ablation during recent hospitalization.  Her amiodarone was discontinued.  Upon admit to hospital an echocardiogram showed an ejection fracture of 15 to 20% which was at baseline.  Patient continued with orthostatic hypotension.  Cardiology was consulted and her cardiac medications were discontinued except for low-dose bisoprolol however this also was discontinued.  Recommendations were to wear pressure stockings and abdominal binder and use as needed midodrine if needed.  Her cortisol level was normal.  Chronic anemia secondary to history of gastric bypass.  Ferritin was elevated but iron stores were low.  She continues on iron supplementation.  Chronic anticoagulation with warfarin.       Transitional Care Course: Today pt sitting up in wheelchair. Pt had a fall self transferring. She denies hitting her head or LOC. She is reporting more pain in her right hip. Chronic low back with radiculopathy in her right hip. She continues on tramadol. Recent AV node ablation. No SOB or CP. Constipation improved.     Assessment/Plan:     Atrial fibrillation with RVR (H)  S/P ICD (internal cardiac defibrillator) procedure  Status post AV node ablation  -Amiodarone discontinued.  -No longer on beta-blocker.  -Continues on warfarin for anticoagulant.  -INR 4.0.   -Coumadin  Clinic to dose INR.     Acute on chronic diastolic heart failure (H)  -Patient no longer on lisinopril, spironolactone or Lasix secondary to hypotension.  -every day weights.   -Creatine 1.14 and sodium 134.  Monitor.  -No SOB or CP.     Hypothyroidism  -medication induced from Amiodarone.   -TSH 15.60, T3 61, T4 0.81.   -Continues on levothyroxine 25 mcg 3/weekly.   -Will attempt to discontinue now that she is off her amiodarone.  -Follow-up TSH now 11.60.       Low back pain with sciatica, sciatica laterality unspecified, unspecified back pain laterality, unspecified chronicity  Sciatica, right side  Right hip pain  Fall   -Continue PTA gabapentin.   -Increase tramadol to 25 to 50 mg p.o. every 8 hours as needed.  -Flexeril 5 mg TID.   -Pt followed out pt by Pain Team.   -Follow-up with Ortho outpatient.  -No LOC. Pt denies hitting her head.     Diabetes mellitus due to underlying condition with hyperosmolarity without coma, without long-term current use of insulin (H)  -diet controlled.     Constipation  -Continue senna S 2 tabs twice daily.  -MiraLAX 17 g daily.      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    Cervical high risk HPV (human papillomavirus) test positive 05/11/2023    Bilateral lower extremity edema 05/11/2023    Anemia, unspecified 04/30/2013    Allergic rhinitis, mild 05/07/2024    Hyperparathyroidism (H24) 10/07/2012    Hyperlipidemia LDL goal <70 11/19/2011    History of sepsis 03/18/2024    History of nephrolithiasis 01/13/2013    Hand pain 10/25/2014    DEISI (generalized anxiety disorder) 02/23/2016    Elevated troponin 12/03/2022    Dysthymia 09/23/2015    Degenerative disc  disease, lumbar 01/05/2024    Decreased functional mobility and endurance 01/05/2024    Cough 05/17/2018    Hypertension 05/07/2024    Osteoarthritis of both hips 10/31/2023    Open wound of skin 04/09/2023    Obstructive sleep apnea 05/07/2024    Nevus of left foot 12/31/2015    Neck pain, chronic 04/18/2012    Chronic pain disorder 05/17/2018    Major depressive disorder, recurrent severe without psychotic features (H) 01/10/2023    Leukocytosis 12/03/2022    Lactic acidosis 04/29/2024    ICD (implantable cardioverter-defibrillator) in place 12/03/2022    Pre-syncope 12/03/2022    Paroxysmal atrial fibrillation (H) 10/31/2023    Atrial fibrillation with RVR (H) 04/29/2024    Pain in joint 10/25/2014    Osteopenia 07/16/2012    Osteoarthritis of both knees 06/18/2018    Type 2 diabetes mellitus with complication, without long-term current use of insulin (H) 09/16/2006    Tension type headache 03/04/2014    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    Sciatica, right side 05/11/2023    S/P gastric bypass 09/28/2012    Restless legs 04/19/2016    Vitamin D deficiency 09/19/2007    Ventricular tachycardia (H) 11/01/2022    Venous stasis dermatitis of both lower extremities 05/11/2023    COVID-19 12/01/2021    Low back pain 04/18/2012    MDD (major depressive disorder), recurrent episode, moderate (H) 02/23/2016    Primary osteoarthritis of both knees 06/18/2018    ANGELLA (acute kidney injury) (H24) 05/29/2024    Hyperkalemia 05/29/2024    Recurrent falls 05/29/2024    Supratherapeutic INR 05/29/2024    Hypotension 05/29/2024     Resolved Ambulatory Problems     Diagnosis Date Noted    No Resolved Ambulatory Problems     No Additional Past Medical History     Allergies   Allergen Reactions    Aspirin Other (See Comments)     History of gastric bypass surgery    Cats Other (See Comments)     Runny nose    Cephalexin Other (See Comments)     Insomnia    Dust Mites Other (See Comments)     Runny nose    Ibuprofen  Other (See Comments)     History of gastric bypass surgery    Nsaids Other (See Comments)     History of gastric bypass surgery       All Meds and Allergies reviewed in the record at the facility and is the most up-to-date.    Current Outpatient Medications   Medication Sig Dispense Refill    acetaminophen (ACETAMINOPHEN 8 HOUR) 650 MG CR tablet Take 650 mg by mouth every 8 hours as needed for mild pain or fever      albuterol (PROAIR HFA/PROVENTIL HFA/VENTOLIN HFA) 108 (90 Base) MCG/ACT inhaler Inhale 2 puffs into the lungs 4 times daily as needed for shortness of breath, wheezing or cough      aspirin 81 MG EC tablet Take 162 mg by mouth daily      atorvastatin (LIPITOR) 80 MG tablet Take 80 mg by mouth daily      bisoprolol (ZEBETA) 5 MG tablet Take 5 mg by mouth daily      buPROPion (WELLBUTRIN) 75 MG tablet Take 120 mg by mouth 2 times daily      clonazePAM (KLONOPIN) 0.5 MG tablet Take 0.5 mg by mouth daily as needed for anxiety      diclofenac (VOLTAREN) 1 % topical gel Apply 2 g topically 4 times daily      DULoxetine (CYMBALTA) 30 MG capsule Take 30 mg by mouth daily      ferrous sulfate (FEROSUL) 325 (65 Fe) MG tablet Take 325 mg by mouth every 48 hours      fexofenadine (ALLEGRA) 60 MG tablet Take 60 mg by mouth 2 times daily as needed for allergies      furosemide (LASIX) 40 MG tablet Take 20 mg by mouth daily as needed      gabapentin (NEURONTIN) 600 MG tablet Take 300-900 mg by mouth 3 times daily 300 mg PO every morning and afternoon; 900 mg every evening at bedtime      levothyroxine (SYNTHROID/LEVOTHROID) 25 MCG tablet Take 25 mcg by mouth three times a week      magnesium oxide (MAG-OX) 400 MG tablet Take 400 mg by mouth daily      melatonin 3 MG tablet Take 3 mg by mouth nightly as needed for sleep      Multiple Vitamins-Minerals (MULTIVITAMIN ADULTS) TABS Take 1 tablet by mouth daily      nitroGLYcerin (NITROSTAT) 0.4 MG sublingual tablet Place 0.4 mg under the tongue every 5 minutes as needed  "for chest pain For chest pain place 1 tablet under the tongue every 5 minutes for 3 doses. If symptoms persist 5 minutes after 1st dose call 911.      traMADol (ULTRAM) 50 MG tablet 25-50mg by mouth every 8 hours as needed.  Pain rated 1-5 = 25mg and pain rated 6-10 = 50mg 16 tablet 1    traZODone (DESYREL) 50 MG tablet Take 50 mg by mouth nightly as needed for sleep      vitamin B-12 (CYANOCOBALAMIN) 1000 MCG tablet Take 1,000 mcg by mouth daily      vitamin D3 (CHOLECALCIFEROL) 1.25 MG (93855 UT) capsule Take 50,000 Units by mouth daily       No current facility-administered medications for this visit.       REVIEW OF SYSTEMS:   10 point review of systems reviewed and pertinent positives in the HPI.     PHYSICAL EXAMINATION:  Physical Exam     Vital signs: /68   Pulse 80   Temp 97.2  F (36.2  C)   Resp 19   Ht 1.715 m (5' 7.5\")   Wt 110.7 kg (244 lb)   SpO2 100%   BMI 37.65 kg/m    General: Awake, Alert, oriented x3, conversant.  HEENT:Pink conjunctiva, moist oral mucosa  NECK: Supple  CVS:  S1  S2, without murmur or gallop.   LUNG: Clear to auscultation, No wheezes, rales or rhonci.   BACK: No kyphosis of the thoracic spine  ABDOMEN: Soft, obese, non tender to palpation, with positive bowel sounds  EXTREMITIES: Moves both upper and lower extremities with generalized weakness, trace pedal edema, no calf tenderness.   SKIN: Warm and dry  NEUROLOGIC: Intact, pulses palpable  PSYCHIATRIC: Pleasant affect.      Labs:  All labs reviewed in the nursing home record and [facility]     This note has been dictated using voice recognition software. Any grammatical or context distortions are unintentional and inherent to the software    Electronically signed by: Nina Birch CNP   "

## 2024-06-14 NOTE — PROGRESS NOTES
ANTICOAGULATION MANAGEMENT     Vikki Evans 63 year old female is on warfarin with supratherapeutic INR result. (Goal INR 2.0-3.0)    Recent labs: (last 7 days)     06/14/24  0000   INR 3.6*       ASSESSMENT     Source(s): Chart Review and Home Care/Facility Nurse - Jen    Warfarin doses taken: Warfarin taken as instructed  Diet: No new diet changes identified  Medication/supplement changes: None noted  New illness, injury, or hospitalization: Slip from shower chair on 6/12/24 - no injury   Signs or symptoms of bleeding or clotting: No  Previous result: Supratherapeutic  Additional findings: None       PLAN     Recommended plan for no diet, medication or health factor changes affecting INR     Dosing Instructions: hold dose then decrease your warfarin dose (10% change) with next INR in 3 days       Summary  As of 6/14/2024      Full warfarin instructions:  6/14: Hold; Otherwise 2 mg every Mon, Thu; 1 mg all other days   Next INR check:  6/17/2024               Telephone call with Jen [facility] nurse (medical care for seniors) who agrees to plan and repeated back plan correctly    Orders given to  Homecare nurse/facility to recheck    Education provided:   Please call back if any changes to your diet, medications or how you've been taking warfarin    Plan made per [facility] anticoagulation protocol    Vernell Skinner RN  Anticoagulation Clinic  6/14/2024    _______________________________________________________________________     Anticoagulation Episode Summary       Current INR goal:  2.0-3.0   TTR:  48.1% (4 wk)   Target end date:  Indefinite   Send INR reminders to:  [facility] (TCU/LTC/[facility])    Indications    Atrial fibrillation with RVR (H) [I48.91]             Comments:  A-Fib             Anticoagulation Care Providers       Provider Role Specialty Phone number    Nina Birch NP Referring Nurse Practitioner [phone]

## 2024-06-14 NOTE — PROGRESS NOTES
[facility] faxed INR of 3.6 from 6/14.   Per note, pt got 2 mg on 6/11, 1 mg on 6/12 and 6/13.   No changes/concerns noted    Vikki Mackey RN

## 2024-06-19 ENCOUNTER — TELEPHONE (OUTPATIENT)
Dept: ANTICOAGULATION | Facility: CLINIC | Age: 64
End: 2024-06-19
Payer: COMMERCIAL

## 2024-06-19 DIAGNOSIS — I48.91 ATRIAL FIBRILLATION WITH RVR (H): Primary | ICD-10-CM

## 2024-06-19 NOTE — TELEPHONE ENCOUNTER
Spoke with [facility] nurse, Yamile, who informed patient passed 24.    Verified by chart review via Care Everywhere    ANTICOAGULATION  MANAGEMENT    Vikki Evans is being discharged from the [facility] ([facility]).    Reason for discharge:     Anticoagulation episode resolved, ACC referral closed, and Standing order discontinued        Vernell Skinner RN